# Patient Record
Sex: MALE | Race: WHITE | URBAN - METROPOLITAN AREA
[De-identification: names, ages, dates, MRNs, and addresses within clinical notes are randomized per-mention and may not be internally consistent; named-entity substitution may affect disease eponyms.]

---

## 2018-10-26 ENCOUNTER — EMERGENCY (EMERGENCY)
Facility: HOSPITAL | Age: 43
LOS: 0 days | Discharge: HOME | End: 2018-10-27
Attending: EMERGENCY MEDICINE | Admitting: EMERGENCY MEDICINE
Payer: COMMERCIAL

## 2018-10-26 VITALS — WEIGHT: 220.02 LBS | HEIGHT: 72 IN

## 2018-10-26 DIAGNOSIS — R10.9 UNSPECIFIED ABDOMINAL PAIN: ICD-10-CM

## 2018-10-26 DIAGNOSIS — E27.8 OTHER SPECIFIED DISORDERS OF ADRENAL GLAND: ICD-10-CM

## 2018-10-26 DIAGNOSIS — N20.0 CALCULUS OF KIDNEY: ICD-10-CM

## 2018-10-26 LAB
ALBUMIN SERPL ELPH-MCNC: 4.2 G/DL — SIGNIFICANT CHANGE UP (ref 3.3–5)
ALP SERPL-CCNC: 58 U/L — SIGNIFICANT CHANGE UP (ref 40–120)
ALT FLD-CCNC: 21 U/L — SIGNIFICANT CHANGE UP (ref 12–78)
ANION GAP SERPL CALC-SCNC: 5 MMOL/L — SIGNIFICANT CHANGE UP (ref 5–17)
APTT BLD: 32.8 SEC — SIGNIFICANT CHANGE UP (ref 27.5–37.4)
AST SERPL-CCNC: 26 U/L — SIGNIFICANT CHANGE UP (ref 15–37)
BASOPHILS # BLD AUTO: 0.03 K/UL — SIGNIFICANT CHANGE UP (ref 0–0.2)
BASOPHILS NFR BLD AUTO: 0.4 % — SIGNIFICANT CHANGE UP (ref 0–2)
BILIRUB SERPL-MCNC: 0.9 MG/DL — SIGNIFICANT CHANGE UP (ref 0.2–1.2)
BUN SERPL-MCNC: 12 MG/DL — SIGNIFICANT CHANGE UP (ref 7–23)
CALCIUM SERPL-MCNC: 9.1 MG/DL — SIGNIFICANT CHANGE UP (ref 8.5–10.1)
CHLORIDE SERPL-SCNC: 104 MMOL/L — SIGNIFICANT CHANGE UP (ref 96–108)
CO2 SERPL-SCNC: 30 MMOL/L — SIGNIFICANT CHANGE UP (ref 22–31)
CREAT SERPL-MCNC: 1.28 MG/DL — SIGNIFICANT CHANGE UP (ref 0.5–1.3)
EOSINOPHIL # BLD AUTO: 0.03 K/UL — SIGNIFICANT CHANGE UP (ref 0–0.5)
EOSINOPHIL NFR BLD AUTO: 0.4 % — SIGNIFICANT CHANGE UP (ref 0–6)
GLUCOSE SERPL-MCNC: 106 MG/DL — HIGH (ref 70–99)
HCT VFR BLD CALC: 43.4 % — SIGNIFICANT CHANGE UP (ref 39–50)
HGB BLD-MCNC: 14.9 G/DL — SIGNIFICANT CHANGE UP (ref 13–17)
IMM GRANULOCYTES NFR BLD AUTO: 0.4 % — SIGNIFICANT CHANGE UP (ref 0–1.5)
INR BLD: 1.12 RATIO — SIGNIFICANT CHANGE UP (ref 0.88–1.16)
LIDOCAIN IGE QN: 66 U/L — LOW (ref 73–393)
LYMPHOCYTES # BLD AUTO: 1.31 K/UL — SIGNIFICANT CHANGE UP (ref 1–3.3)
LYMPHOCYTES # BLD AUTO: 15.6 % — SIGNIFICANT CHANGE UP (ref 13–44)
MCHC RBC-ENTMCNC: 29.8 PG — SIGNIFICANT CHANGE UP (ref 27–34)
MCHC RBC-ENTMCNC: 34.3 GM/DL — SIGNIFICANT CHANGE UP (ref 32–36)
MCV RBC AUTO: 86.8 FL — SIGNIFICANT CHANGE UP (ref 80–100)
MONOCYTES # BLD AUTO: 0.67 K/UL — SIGNIFICANT CHANGE UP (ref 0–0.9)
MONOCYTES NFR BLD AUTO: 8 % — SIGNIFICANT CHANGE UP (ref 2–14)
NEUTROPHILS # BLD AUTO: 6.33 K/UL — SIGNIFICANT CHANGE UP (ref 1.8–7.4)
NEUTROPHILS NFR BLD AUTO: 75.2 % — SIGNIFICANT CHANGE UP (ref 43–77)
NRBC # BLD: 0 /100 WBCS — SIGNIFICANT CHANGE UP (ref 0–0)
PLATELET # BLD AUTO: 193 K/UL — SIGNIFICANT CHANGE UP (ref 150–400)
POTASSIUM SERPL-MCNC: 4.5 MMOL/L — SIGNIFICANT CHANGE UP (ref 3.5–5.3)
POTASSIUM SERPL-SCNC: 4.5 MMOL/L — SIGNIFICANT CHANGE UP (ref 3.5–5.3)
PROT SERPL-MCNC: 7.8 GM/DL — SIGNIFICANT CHANGE UP (ref 6–8.3)
PROTHROM AB SERPL-ACNC: 12.1 SEC — SIGNIFICANT CHANGE UP (ref 9.8–12.7)
RBC # BLD: 5 M/UL — SIGNIFICANT CHANGE UP (ref 4.2–5.8)
RBC # FLD: 12.6 % — SIGNIFICANT CHANGE UP (ref 10.3–14.5)
SODIUM SERPL-SCNC: 139 MMOL/L — SIGNIFICANT CHANGE UP (ref 135–145)
WBC # BLD: 8.4 K/UL — SIGNIFICANT CHANGE UP (ref 3.8–10.5)
WBC # FLD AUTO: 8.4 K/UL — SIGNIFICANT CHANGE UP (ref 3.8–10.5)

## 2018-10-26 PROCEDURE — 74177 CT ABD & PELVIS W/CONTRAST: CPT | Mod: 26

## 2018-10-26 PROCEDURE — 99284 EMERGENCY DEPT VISIT MOD MDM: CPT | Mod: 25

## 2018-10-26 RX ORDER — PANTOPRAZOLE SODIUM 20 MG/1
40 TABLET, DELAYED RELEASE ORAL ONCE
Qty: 0 | Refills: 0 | Status: DISCONTINUED | OUTPATIENT
Start: 2018-10-26 | End: 2018-10-26

## 2018-10-26 RX ORDER — SODIUM CHLORIDE 9 MG/ML
1000 INJECTION INTRAMUSCULAR; INTRAVENOUS; SUBCUTANEOUS ONCE
Qty: 0 | Refills: 0 | Status: COMPLETED | OUTPATIENT
Start: 2018-10-26 | End: 2018-10-26

## 2018-10-26 RX ADMIN — SODIUM CHLORIDE 1000 MILLILITER(S): 9 INJECTION INTRAMUSCULAR; INTRAVENOUS; SUBCUTANEOUS at 20:19

## 2018-10-26 NOTE — ED ADULT NURSE NOTE - OBJECTIVE STATEMENT
pt. c/o of abd. pain that started this AM. pt. states pain starts on right sides and radiates anteriorly to left side. pt. denies CP, SOB, n/v/d, dysuria, constipation, fever, chills. pt. states eating and drinking increase the pain. His co-worker experienced similar symptoms today.

## 2018-10-26 NOTE — ED STATDOCS - ATTENDING CONTRIBUTION TO CARE
I, Carlos Ocampo DO,  performed the initial face to face bedside interview with this patient regarding history of present illness, review of symptoms and relevant past medical, social and family history.  I completed an independent physical examination.  I was the initial provider who evaluated this patient. I have signed out the follow up of any pending tests (i.e. labs, radiological studies) to the ACP.  I have communicated the patient’s plan of care and disposition with the ACP.

## 2018-10-26 NOTE — ED STATDOCS - NS_ ATTENDINGSCRIBEDETAILS _ED_A_ED_FT
Carlos Ocampo DO (Attending): The history, relevant review of systems, past medical and surgical history, medical decision making, and physical examination was documented by the scribe in my presence and I attest to the accuracy of the documentation.

## 2018-10-26 NOTE — ED STATDOCS - PROGRESS NOTE DETAILS
42 y/o male with a PMHx of kidney stones presents to the ED c/o waxing and waning, intermittent right sided abd pain since today, relieved by belching, radiating to left side of abd. +decreased appetite. No BM yet today. Denies fever, chills, n/v/d, dysuria. +family history gallbladder issues. Denies tobacco use, EtOH use, illicit drug use. Declines HIV testing. Abdomen: soft, non tender, non distended, no cva tenderness. plan: ct, labs and reeval. pt refused pain meds. -Isamar Saini PA-C spoke to patient in depth about his ct results advised him to fu with pmd Bruce Macias for MRI in regards to his adrenal glands. pt also educated on increase fluids and fu with urologist for his kidney stone. pt agrees with plan and well appearing on dc. -Isamar Saini PA-C

## 2018-10-26 NOTE — ED STATDOCS - OBJECTIVE STATEMENT
44 y/o male with a PMHx of kidney stones presents to the ED c/o waxing and waning, intermittent right sided abd pain since today, relieved by belching, radiating to left side of abd. +decreased appetite. No BM yet today. Denies fever, chills, n/v/d, dysuria. +family history gallbladder issues. Denies tobacco use, EtOH use, illicit drug use. Declines HIV testing.

## 2018-10-27 VITALS
SYSTOLIC BLOOD PRESSURE: 141 MMHG | OXYGEN SATURATION: 98 % | HEART RATE: 63 BPM | DIASTOLIC BLOOD PRESSURE: 96 MMHG | RESPIRATION RATE: 19 BRPM | TEMPERATURE: 99 F

## 2018-10-27 RX ORDER — TAMSULOSIN HYDROCHLORIDE 0.4 MG/1
0.4 CAPSULE ORAL AT BEDTIME
Qty: 0 | Refills: 0 | Status: DISCONTINUED | OUTPATIENT
Start: 2018-10-27 | End: 2018-10-27

## 2018-10-27 RX ORDER — IBUPROFEN 200 MG
1 TABLET ORAL
Qty: 21 | Refills: 0
Start: 2018-10-27 | End: 2018-11-02

## 2018-10-27 RX ORDER — IBUPROFEN 200 MG
600 TABLET ORAL ONCE
Qty: 0 | Refills: 0 | Status: COMPLETED | OUTPATIENT
Start: 2018-10-27 | End: 2018-10-27

## 2018-10-27 RX ORDER — TAMSULOSIN HYDROCHLORIDE 0.4 MG/1
1 CAPSULE ORAL
Qty: 7 | Refills: 0
Start: 2018-10-27 | End: 2018-11-02

## 2018-10-27 RX ADMIN — TAMSULOSIN HYDROCHLORIDE 0.4 MILLIGRAM(S): 0.4 CAPSULE ORAL at 00:21

## 2020-01-25 ENCOUNTER — INPATIENT (INPATIENT)
Facility: HOSPITAL | Age: 45
LOS: 1 days | Discharge: HOME | DRG: 392 | End: 2020-01-27
Attending: COLON & RECTAL SURGERY | Admitting: COLON & RECTAL SURGERY
Payer: COMMERCIAL

## 2020-01-25 VITALS — HEIGHT: 72 IN | WEIGHT: 220.02 LBS

## 2020-01-25 DIAGNOSIS — K57.20 DIVERTICULITIS OF LARGE INTESTINE WITH PERFORATION AND ABSCESS WITHOUT BLEEDING: ICD-10-CM

## 2020-01-25 PROBLEM — N20.0 CALCULUS OF KIDNEY: Chronic | Status: ACTIVE | Noted: 2018-10-27

## 2020-01-25 LAB
ALBUMIN SERPL ELPH-MCNC: 3.3 G/DL — SIGNIFICANT CHANGE UP (ref 3.3–5)
ALP SERPL-CCNC: 75 U/L — SIGNIFICANT CHANGE UP (ref 40–120)
ALT FLD-CCNC: 18 U/L — SIGNIFICANT CHANGE UP (ref 12–78)
ANION GAP SERPL CALC-SCNC: 3 MMOL/L — LOW (ref 5–17)
APPEARANCE UR: CLEAR — SIGNIFICANT CHANGE UP
APTT BLD: 33.4 SEC — SIGNIFICANT CHANGE UP (ref 27.5–36.3)
AST SERPL-CCNC: 17 U/L — SIGNIFICANT CHANGE UP (ref 15–37)
BASOPHILS # BLD AUTO: 0.03 K/UL — SIGNIFICANT CHANGE UP (ref 0–0.2)
BASOPHILS NFR BLD AUTO: 0.3 % — SIGNIFICANT CHANGE UP (ref 0–2)
BILIRUB SERPL-MCNC: 0.4 MG/DL — SIGNIFICANT CHANGE UP (ref 0.2–1.2)
BILIRUB UR-MCNC: NEGATIVE — SIGNIFICANT CHANGE UP
BUN SERPL-MCNC: 14 MG/DL — SIGNIFICANT CHANGE UP (ref 7–23)
CALCIUM SERPL-MCNC: 9 MG/DL — SIGNIFICANT CHANGE UP (ref 8.5–10.1)
CHLORIDE SERPL-SCNC: 106 MMOL/L — SIGNIFICANT CHANGE UP (ref 96–108)
CO2 SERPL-SCNC: 31 MMOL/L — SIGNIFICANT CHANGE UP (ref 22–31)
COLOR SPEC: YELLOW — SIGNIFICANT CHANGE UP
CREAT SERPL-MCNC: 1.02 MG/DL — SIGNIFICANT CHANGE UP (ref 0.5–1.3)
DIFF PNL FLD: NEGATIVE — SIGNIFICANT CHANGE UP
EOSINOPHIL # BLD AUTO: 0.11 K/UL — SIGNIFICANT CHANGE UP (ref 0–0.5)
EOSINOPHIL NFR BLD AUTO: 1.1 % — SIGNIFICANT CHANGE UP (ref 0–6)
GLUCOSE SERPL-MCNC: 85 MG/DL — SIGNIFICANT CHANGE UP (ref 70–99)
GLUCOSE UR QL: NEGATIVE MG/DL — SIGNIFICANT CHANGE UP
HCT VFR BLD CALC: 41.1 % — SIGNIFICANT CHANGE UP (ref 39–50)
HGB BLD-MCNC: 13.9 G/DL — SIGNIFICANT CHANGE UP (ref 13–17)
IMM GRANULOCYTES NFR BLD AUTO: 0.6 % — SIGNIFICANT CHANGE UP (ref 0–1.5)
INR BLD: 1.24 RATIO — HIGH (ref 0.88–1.16)
KETONES UR-MCNC: NEGATIVE — SIGNIFICANT CHANGE UP
LEUKOCYTE ESTERASE UR-ACNC: NEGATIVE — SIGNIFICANT CHANGE UP
LIDOCAIN IGE QN: 87 U/L — SIGNIFICANT CHANGE UP (ref 73–393)
LYMPHOCYTES # BLD AUTO: 1.33 K/UL — SIGNIFICANT CHANGE UP (ref 1–3.3)
LYMPHOCYTES # BLD AUTO: 13 % — SIGNIFICANT CHANGE UP (ref 13–44)
MCHC RBC-ENTMCNC: 29.1 PG — SIGNIFICANT CHANGE UP (ref 27–34)
MCHC RBC-ENTMCNC: 33.8 GM/DL — SIGNIFICANT CHANGE UP (ref 32–36)
MCV RBC AUTO: 86 FL — SIGNIFICANT CHANGE UP (ref 80–100)
MONOCYTES # BLD AUTO: 0.82 K/UL — SIGNIFICANT CHANGE UP (ref 0–0.9)
MONOCYTES NFR BLD AUTO: 8 % — SIGNIFICANT CHANGE UP (ref 2–14)
NEUTROPHILS # BLD AUTO: 7.92 K/UL — HIGH (ref 1.8–7.4)
NEUTROPHILS NFR BLD AUTO: 77 % — SIGNIFICANT CHANGE UP (ref 43–77)
NITRITE UR-MCNC: NEGATIVE — SIGNIFICANT CHANGE UP
PH UR: 6.5 — SIGNIFICANT CHANGE UP (ref 5–8)
PLATELET # BLD AUTO: 309 K/UL — SIGNIFICANT CHANGE UP (ref 150–400)
POTASSIUM SERPL-MCNC: 3.8 MMOL/L — SIGNIFICANT CHANGE UP (ref 3.5–5.3)
POTASSIUM SERPL-SCNC: 3.8 MMOL/L — SIGNIFICANT CHANGE UP (ref 3.5–5.3)
PROT SERPL-MCNC: 7.9 GM/DL — SIGNIFICANT CHANGE UP (ref 6–8.3)
PROT UR-MCNC: NEGATIVE MG/DL — SIGNIFICANT CHANGE UP
PROTHROM AB SERPL-ACNC: 13.8 SEC — HIGH (ref 10–12.9)
RBC # BLD: 4.78 M/UL — SIGNIFICANT CHANGE UP (ref 4.2–5.8)
RBC # FLD: 12.1 % — SIGNIFICANT CHANGE UP (ref 10.3–14.5)
SODIUM SERPL-SCNC: 140 MMOL/L — SIGNIFICANT CHANGE UP (ref 135–145)
SP GR SPEC: 1.01 — SIGNIFICANT CHANGE UP (ref 1.01–1.02)
UROBILINOGEN FLD QL: NEGATIVE MG/DL — SIGNIFICANT CHANGE UP
WBC # BLD: 10.27 K/UL — SIGNIFICANT CHANGE UP (ref 3.8–10.5)
WBC # FLD AUTO: 10.27 K/UL — SIGNIFICANT CHANGE UP (ref 3.8–10.5)

## 2020-01-25 PROCEDURE — 36415 COLL VENOUS BLD VENIPUNCTURE: CPT

## 2020-01-25 PROCEDURE — 99221 1ST HOSP IP/OBS SF/LOW 40: CPT

## 2020-01-25 PROCEDURE — 80048 BASIC METABOLIC PNL TOTAL CA: CPT

## 2020-01-25 PROCEDURE — 83735 ASSAY OF MAGNESIUM: CPT

## 2020-01-25 PROCEDURE — 74177 CT ABD & PELVIS W/CONTRAST: CPT | Mod: 26

## 2020-01-25 PROCEDURE — 85027 COMPLETE CBC AUTOMATED: CPT

## 2020-01-25 PROCEDURE — 84100 ASSAY OF PHOSPHORUS: CPT

## 2020-01-25 PROCEDURE — 87040 BLOOD CULTURE FOR BACTERIA: CPT

## 2020-01-25 RX ORDER — METRONIDAZOLE 500 MG
500 TABLET ORAL ONCE
Refills: 0 | Status: COMPLETED | OUTPATIENT
Start: 2020-01-25 | End: 2020-01-25

## 2020-01-25 RX ORDER — PIPERACILLIN AND TAZOBACTAM 4; .5 G/20ML; G/20ML
3.38 INJECTION, POWDER, LYOPHILIZED, FOR SOLUTION INTRAVENOUS EVERY 8 HOURS
Refills: 0 | Status: DISCONTINUED | OUTPATIENT
Start: 2020-01-25 | End: 2020-01-27

## 2020-01-25 RX ORDER — ENOXAPARIN SODIUM 100 MG/ML
40 INJECTION SUBCUTANEOUS DAILY
Refills: 0 | Status: DISCONTINUED | OUTPATIENT
Start: 2020-01-25 | End: 2020-01-27

## 2020-01-25 RX ORDER — SODIUM CHLORIDE 9 MG/ML
1000 INJECTION INTRAMUSCULAR; INTRAVENOUS; SUBCUTANEOUS
Refills: 0 | Status: DISCONTINUED | OUTPATIENT
Start: 2020-01-25 | End: 2020-01-26

## 2020-01-25 RX ORDER — ACETAMINOPHEN 500 MG
650 TABLET ORAL ONCE
Refills: 0 | Status: DISCONTINUED | OUTPATIENT
Start: 2020-01-25 | End: 2020-01-27

## 2020-01-25 RX ORDER — KETOROLAC TROMETHAMINE 30 MG/ML
15 SYRINGE (ML) INJECTION EVERY 4 HOURS
Refills: 0 | Status: DISCONTINUED | OUTPATIENT
Start: 2020-01-25 | End: 2020-01-27

## 2020-01-25 RX ORDER — KETOROLAC TROMETHAMINE 30 MG/ML
30 SYRINGE (ML) INJECTION ONCE
Refills: 0 | Status: DISCONTINUED | OUTPATIENT
Start: 2020-01-25 | End: 2020-01-25

## 2020-01-25 RX ORDER — CIPROFLOXACIN LACTATE 400MG/40ML
400 VIAL (ML) INTRAVENOUS ONCE
Refills: 0 | Status: COMPLETED | OUTPATIENT
Start: 2020-01-25 | End: 2020-01-25

## 2020-01-25 RX ORDER — TAMSULOSIN HYDROCHLORIDE 0.4 MG/1
0.4 CAPSULE ORAL AT BEDTIME
Refills: 0 | Status: DISCONTINUED | OUTPATIENT
Start: 2020-01-25 | End: 2020-01-27

## 2020-01-25 RX ORDER — SODIUM CHLORIDE 9 MG/ML
1000 INJECTION INTRAMUSCULAR; INTRAVENOUS; SUBCUTANEOUS ONCE
Refills: 0 | Status: COMPLETED | OUTPATIENT
Start: 2020-01-25 | End: 2020-01-25

## 2020-01-25 RX ADMIN — Medication 30 MILLIGRAM(S): at 17:44

## 2020-01-25 RX ADMIN — Medication 100 MILLIGRAM(S): at 19:55

## 2020-01-25 RX ADMIN — PIPERACILLIN AND TAZOBACTAM 25 GRAM(S): 4; .5 INJECTION, POWDER, LYOPHILIZED, FOR SOLUTION INTRAVENOUS at 22:28

## 2020-01-25 RX ADMIN — SODIUM CHLORIDE 1000 MILLILITER(S): 9 INJECTION INTRAMUSCULAR; INTRAVENOUS; SUBCUTANEOUS at 17:44

## 2020-01-25 RX ADMIN — Medication 200 MILLIGRAM(S): at 21:11

## 2020-01-25 NOTE — ED ADULT TRIAGE NOTE - CHIEF COMPLAINT QUOTE
pt presents to ED with complaints of lower abdominal pain x 4 days. pt reports hx of kidney stones and reports pain is similar to past stones. denies blood in urine, but endorses pain on urination.

## 2020-01-25 NOTE — H&P ADULT - HISTORY OF PRESENT ILLNESS
Pt is a 44 M with lower abdominal pain for 2 days. He has not had pain like this before, but states it was similar to his kidney stones. He has never had diverticulitis in the past, he denies prior colonoscopy. He reports mild nausea, is having bowel movements and flatus but is more constipated than normal. He denies fevers.

## 2020-01-25 NOTE — H&P ADULT - ATTENDING COMMENTS
Seen and examined.  First episode of diverticulitis, no prior colonoscopy.  CT with contained perforation, no free air or fluid.  Labwork and imaging reviewed  Admit, NPO, IVF, abx.  Follow abdominal exam.  Advance diet as clinically improves.  Re-scan if clinically worsens per vital signs, labwork and clinical exam.

## 2020-01-25 NOTE — ED STATDOCS - NS_ ATTENDINGSCRIBEDETAILS _ED_A_ED_FT
I, Jacques Lion MD,  performed the initial face to face bedside interview with this patient regarding history of present illness, review of symptoms and relevant past medical, social and family history.  I completed an independent physical examination.  I was the initial provider who evaluated this patient.  The history, relevant review of systems, past medical and surgical history, medical decision making, and physical examination was documented by the scribe in my presence and I attest to the accuracy of the documentation.

## 2020-01-25 NOTE — ED STATDOCS - CLINICAL SUMMARY MEDICAL DECISION MAKING FREE TEXT BOX
45 y/o M presents to ED with lower abdominal pain worse over past day, exam with TTP with RLQ, LLQ, with guarding no rebound, concern for appendicitis vs diverticulitis vs. UTI, will obtain labs, CT, pain control, reassess.

## 2020-01-25 NOTE — ED STATDOCS - PHYSICAL EXAMINATION
Constitutional: NAD AAOx3  Eyes: PERRLA EOMI  Head: Normocephalic atraumatic  Mouth: MMM  Cardiac: +tachycardic rate   Resp: Lungs CTAB  GI: +ttp abdomen RLQ, LLQ   Neuro: CN2-12 intact  Skin: No rashes Constitutional: mild distressAAOx3  Eyes: PERRLA EOMI  Head: Normocephalic atraumatic  Mouth: MMM  Cardiac: +tachycardic rate   Resp: Lungs CTAB  GI: +ttp abdomen RLQ, LLQ guarding no rebound no cvat  Neuro: CN2-12 intact  Skin: No rashes

## 2020-01-25 NOTE — H&P ADULT - NSHPPHYSICALEXAM_GEN_ALL_CORE
Vital Signs Last 24 Hrs  T(C): 37.6 (25 Jan 2020 16:39), Max: 37.6 (25 Jan 2020 16:39)  T(F): 99.6 (25 Jan 2020 16:39), Max: 99.6 (25 Jan 2020 16:39)  HR: 112 (25 Jan 2020 16:39) (112 - 112)  BP: 140/82 (25 Jan 2020 16:39) (140/82 - 140/82)  BP(mean): --  RR: 14 (25 Jan 2020 16:39) (14 - 14)  SpO2: 97% (25 Jan 2020 16:39) (97% - 97%)      GEN NAD AOX3  CV RRR  Pulm CTA b/l  Abd Soft nontender nondistended no rebound or guarding

## 2020-01-25 NOTE — ED STATDOCS - NS ED ROS FT
Constitutional: No fever +chills.  Eyes: No visual changes  HEENT: No throat pain  CV: No chest pain  Resp: No SOB no cough  GI: No nausea or vomiting. +abdominal pain.  : +mild dysuria.   MSK: No musculoskeletal pain  Skin: No rash  Neuro: +headache.

## 2020-01-25 NOTE — H&P ADULT - NSHPLABSRESULTS_GEN_ALL_CORE
Complete Blood Count + Automated Diff (01.25.20 @ 17:20)    WBC Count: 10.27 K/uL    RBC Count: 4.78 M/uL    Hemoglobin: 13.9 g/dL    Hematocrit: 41.1 %    Mean Cell Volume: 86.0 fl    Mean Cell Hemoglobin: 29.1 pg    Mean Cell Hemoglobin Conc: 33.8 gm/dL    Red Cell Distrib Width: 12.1 %    Platelet Count - Automated: 309 K/uL    Comprehensive Metabolic Panel (01.25.20 @ 17:20)    Sodium, Serum: 140 mmol/L    Potassium, Serum: 3.8 mmol/L    Chloride, Serum: 106 mmol/L    Carbon Dioxide, Serum: 31 mmol/L    Anion Gap, Serum: 3 mmol/L    Blood Urea Nitrogen, Serum: 14 mg/dL    Creatinine, Serum: 1.02 mg/dL    Glucose, Serum: 85 mg/dL    Calcium, Total Serum: 9.0 mg/dL    Protein Total, Serum: 7.9 gm/dL    Albumin, Serum: 3.3 g/dL    Bilirubin Total, Serum: 0.4 mg/dL    Alkaline Phosphatase, Serum: 75 U/L    Aspartate Aminotransferase (AST/SGOT): 17 U/L    Alanine Aminotransferase (ALT/SGPT): 18 U/L    eGFR if Non : 88: Interpretative comment      < from: CT Abdomen and Pelvis w/ IV Cont (01.25.20 @ 18:15) >      EXAM:  CT ABDOMEN AND PELVIS IC                            PROCEDURE DATE:  01/25/2020          INTERPRETATION:  CLINICAL INFORMATION: Right lower quadrant abdominal pain. Rule out appendicitis.    COMPARISON: CT abdomen/pelvis of 10/26/2018.    PROCEDURE:   CT of the Abdomen and Pelvis was performed with intravenous contrast.   Intravenous contrast: 90 ml Omnipaque 350. 10 ml discarded.  Oral contrast: None.  Sagittal and coronal reformats were performed.    FINDINGS:    LOWER CHEST: Stable 2 mm left lower lobe pulmonary nodule (2:20)    LIVER: Within normal limits.  BILE DUCTS: Normal caliber.  GALLBLADDER: Within normal limits.  SPLEEN: Within normal limits.  PANCREAS: Within normal limits.  ADRENALS: 4.7 x 3.7 cm right adrenal gland hypodense mass with associated calcification, relatively stable since prior examination, given differences in technique. Unremarkable left adrenal gland.  KIDNEYS/URETERS: Low-attenuation lesion within the bilateral lower poles too small to accurately characterize. No hydronephrosis. Symmetric parenchymal enhancement.    BLADDER: Within normal limits.  REPRODUCTIVE ORGANS: Prostate within normal limits.    BOWEL: Short segment colonic bowel wall thickening and pericolonic inflammatory change involving the mid to distal sigmoid colon. Localized foci of extraluminal gas consistent with perforation. No bowel obstruction. Appendix normal.  PERITONEUM: No ascites. No remote pneumoperitoneum.  VESSELS: Within normal limits.  RETROPERITONEUM/LYMPH NODES: No lymphadenopathy.    ABDOMINAL WALL: Within normal limits.  BONES: Degenerative disc disease at L4-L5.    IMPRESSION:     Acute mid to distal sigmoid diverticulitis with foci of localized extraluminal gas consistent with perforation. No discrete drainable extraluminal collection.    Given short segment involvement, follow-up with colonoscopy, when technically and clinically feasible would be recommended to exclude underlying lesion, if no contraindications exist.    Relatively stable right adrenal hypodense mass, which can be further evaluated with nonemergent contrast-enhanced MRI with adrenal protocol.                ILIA MCCOY M.D., ATTENDING RADIOLOGIST  This document has been electronically signed. Jan 25 2020  6:35PM                < end of copied text >

## 2020-01-25 NOTE — ED ADULT NURSE NOTE - OBJECTIVE STATEMENT
Patient comes in with b/l flank pain, painful urination and mid - lower abd pain x 3 days. Patient denies chest pain, sob. Patient has hx of kidney stones.

## 2020-01-25 NOTE — ED STATDOCS - PROGRESS NOTE DETAILS
45 y/o M with PMH of kidney stones presents with lower abdominal pain x 4 days. States pain feels similar to prior episodes of kidney stones. + chills, headache, dysuria. Denies fever, vomiting, frequency, urgency. Has not tried medications for pain at home. PE: Well appearing. Cardiac: s1s2, tachycardic. Lungs: CTAB. Abdomen: NBS x4, soft, +RLQ and LLQ tenderness. no CVAT. 43 y/o M with PMH of kidney stones presents with lower abdominal pain x 4 days. States pain feels similar to prior episodes of kidney stones. + chills, headache, dysuria. Denies fever, vomiting, frequency, urgency. Has not tried medications for pain at home. PE: Well appearing. Cardiac: s1s2, tachycardic. Lungs: CTAB. Abdomen: NBS x4, soft, +RLQ and LLQ tenderness. no CVAT. A/P: r/o appendicitis, diverticulitis, less concern for kidney stone based on physical exam. plan for labs, UA, CTAP, analgesia, IVF, reassess. - Binu Wise PA-C CT reports perforated sigmoid diverticulitis. Cipro & flagyl initiated. D/w Dr. Nance, recommended to discuss with colorectal surgery. - Binu Wise PA-C D/w Dr. Neri, recommended admission. - Binu Wise PA-C

## 2020-01-25 NOTE — H&P ADULT - ASSESSMENT
44 year old male with first episode of sigmoid diverticulitis with microperforation    - Admit to Dr. Neri  - Pain control PRN  - IV abx Zosyn  - NPO/IVF  - DVT ppx  - Serial abdominal exams  - Ambulate  - If failure of medical management, patient understands role of surgical intervention    Discussed with Dr. Neri

## 2020-01-25 NOTE — ED STATDOCS - OBJECTIVE STATEMENT
45 y/o male with a PMHx of kidney stones presents to ED c/o lower abdominal pain x4 days. Pt reports pain is similar to past kidney stones, last kidney stone in October, 2019. Abdominal pain radiates to back. +mild dysuria. Denies frequency, urgency. +headache. +chills. Denies hematuria. NKDA. Nonsmoker. No pain medications taken.

## 2020-01-26 LAB
CULTURE RESULTS: NO GROWTH — SIGNIFICANT CHANGE UP
SPECIMEN SOURCE: SIGNIFICANT CHANGE UP

## 2020-01-26 PROCEDURE — 99232 SBSQ HOSP IP/OBS MODERATE 35: CPT

## 2020-01-26 RX ORDER — SODIUM CHLORIDE 9 MG/ML
1000 INJECTION INTRAMUSCULAR; INTRAVENOUS; SUBCUTANEOUS
Refills: 0 | Status: DISCONTINUED | OUTPATIENT
Start: 2020-01-26 | End: 2020-01-27

## 2020-01-26 RX ADMIN — PIPERACILLIN AND TAZOBACTAM 25 GRAM(S): 4; .5 INJECTION, POWDER, LYOPHILIZED, FOR SOLUTION INTRAVENOUS at 13:11

## 2020-01-26 RX ADMIN — Medication 15 MILLIGRAM(S): at 11:43

## 2020-01-26 RX ADMIN — SODIUM CHLORIDE 80 MILLILITER(S): 9 INJECTION INTRAMUSCULAR; INTRAVENOUS; SUBCUTANEOUS at 18:21

## 2020-01-26 RX ADMIN — ENOXAPARIN SODIUM 40 MILLIGRAM(S): 100 INJECTION SUBCUTANEOUS at 11:30

## 2020-01-26 RX ADMIN — PIPERACILLIN AND TAZOBACTAM 25 GRAM(S): 4; .5 INJECTION, POWDER, LYOPHILIZED, FOR SOLUTION INTRAVENOUS at 06:28

## 2020-01-26 RX ADMIN — PIPERACILLIN AND TAZOBACTAM 25 GRAM(S): 4; .5 INJECTION, POWDER, LYOPHILIZED, FOR SOLUTION INTRAVENOUS at 21:16

## 2020-01-26 RX ADMIN — SODIUM CHLORIDE 80 MILLILITER(S): 9 INJECTION INTRAMUSCULAR; INTRAVENOUS; SUBCUTANEOUS at 11:21

## 2020-01-26 RX ADMIN — Medication 15 MILLIGRAM(S): at 11:28

## 2020-01-26 NOTE — PROGRESS NOTE ADULT - ASSESSMENT
44 year old male with first episode of sigmoid diverticulitis with microperforation    - Pain control PRN  - IV abx Zosyn  - Advance Diet as tolerated today  - DVT ppx  - Serial abdominal exams  - Ambulate      Discussed with Dr. Neri

## 2020-01-26 NOTE — PROGRESS NOTE ADULT - SUBJECTIVE AND OBJECTIVE BOX
Pain resolving, afebrile, remains NPO. Ambulating well and feeling better today      Vital Signs Last 24 Hrs  T(C): 37.2 (26 Jan 2020 05:11), Max: 37.6 (25 Jan 2020 16:39)  T(F): 99 (26 Jan 2020 05:11), Max: 99.6 (25 Jan 2020 16:39)  HR: 87 (26 Jan 2020 05:11) (78 - 112)  BP: 106/55 (26 Jan 2020 05:11) (106/55 - 140/82)  BP(mean): 85 (25 Jan 2020 20:10) (85 - 85)  RR: 17 (26 Jan 2020 05:11) (14 - 17)  SpO2: 98% (26 Jan 2020 05:11) (96% - 99%)      GEN NAD AOx3  CV RRR  Puml CAT b/l  Abd Soft nontender nondistened no rebound or guarding    Complete Blood Count + Automated Diff (01.25.20 @ 17:20)    WBC Count: 10.27 K/uL    RBC Count: 4.78 M/uL    Hemoglobin: 13.9 g/dL    Hematocrit: 41.1 %    Mean Cell Volume: 86.0 fl    Mean Cell Hemoglobin: 29.1 pg    Mean Cell Hemoglobin Conc: 33.8 gm/dL    Red Cell Distrib Width: 12.1 %    Platelet Count - Automated: 309 K/uL    Auto Neutrophil #: 7.92 K/uL    Auto Lymphocyte #: 1.33 K/uL    Auto Monocyte #: 0.82 K/uL    Auto Eosinophil #: 0.11 K/uL    Auto Basophil #: 0.03 K/uL    Auto Neutrophil %: 77.0: Differential percentages must be correlated with absolute numbers for  clinical significance. %    Auto Lymphocyte %: 13.0 %    Auto Monocyte %: 8.0 %    Auto Eosinophil %: 1.1 %    Auto Basophil %: 0.3 %    Auto Immature Granulocyte %: 0.6 %

## 2020-01-26 NOTE — PROGRESS NOTE ADULT - ATTENDING COMMENTS
Seen and examined.  Pain improved. Denies N/V. No flatus or stool.  AFVSS  NAD  soft, NT, mild distention  A/P - Contained sigmoid perforation    Advance to clear liquid diet. Decrease IVF. Repeat labwork in AM.  If continues to improve, anticipate continued advancement of diet with d/c tomorrow.  If worsens with clear liquids, may need to re-scan.  Discussed need for outpt colonoscopy in 6-8 weeks to r/o underlying malignancy.   Discussed finding of adrenal mass on CT, pt is aware and states it has been evaluated in past and found to be benign.

## 2020-01-27 ENCOUNTER — TRANSCRIPTION ENCOUNTER (OUTPATIENT)
Age: 45
End: 2020-01-27

## 2020-01-27 VITALS
OXYGEN SATURATION: 96 % | RESPIRATION RATE: 18 BRPM | TEMPERATURE: 97 F | SYSTOLIC BLOOD PRESSURE: 110 MMHG | DIASTOLIC BLOOD PRESSURE: 64 MMHG | HEART RATE: 76 BPM

## 2020-01-27 LAB
ANION GAP SERPL CALC-SCNC: 5 MMOL/L — SIGNIFICANT CHANGE UP (ref 5–17)
BUN SERPL-MCNC: 11 MG/DL — SIGNIFICANT CHANGE UP (ref 7–23)
CALCIUM SERPL-MCNC: 9.1 MG/DL — SIGNIFICANT CHANGE UP (ref 8.5–10.1)
CHLORIDE SERPL-SCNC: 109 MMOL/L — HIGH (ref 96–108)
CO2 SERPL-SCNC: 26 MMOL/L — SIGNIFICANT CHANGE UP (ref 22–31)
CREAT SERPL-MCNC: 0.85 MG/DL — SIGNIFICANT CHANGE UP (ref 0.5–1.3)
GLUCOSE SERPL-MCNC: 86 MG/DL — SIGNIFICANT CHANGE UP (ref 70–99)
HCT VFR BLD CALC: 40.9 % — SIGNIFICANT CHANGE UP (ref 39–50)
HGB BLD-MCNC: 13.7 G/DL — SIGNIFICANT CHANGE UP (ref 13–17)
MAGNESIUM SERPL-MCNC: 2.2 MG/DL — SIGNIFICANT CHANGE UP (ref 1.6–2.6)
MCHC RBC-ENTMCNC: 28.5 PG — SIGNIFICANT CHANGE UP (ref 27–34)
MCHC RBC-ENTMCNC: 33.5 GM/DL — SIGNIFICANT CHANGE UP (ref 32–36)
MCV RBC AUTO: 85 FL — SIGNIFICANT CHANGE UP (ref 80–100)
PHOSPHATE SERPL-MCNC: 2.5 MG/DL — SIGNIFICANT CHANGE UP (ref 2.5–4.5)
PLATELET # BLD AUTO: 314 K/UL — SIGNIFICANT CHANGE UP (ref 150–400)
POTASSIUM SERPL-MCNC: 3.8 MMOL/L — SIGNIFICANT CHANGE UP (ref 3.5–5.3)
POTASSIUM SERPL-SCNC: 3.8 MMOL/L — SIGNIFICANT CHANGE UP (ref 3.5–5.3)
RBC # BLD: 4.81 M/UL — SIGNIFICANT CHANGE UP (ref 4.2–5.8)
RBC # FLD: 11.9 % — SIGNIFICANT CHANGE UP (ref 10.3–14.5)
SODIUM SERPL-SCNC: 140 MMOL/L — SIGNIFICANT CHANGE UP (ref 135–145)
WBC # BLD: 4.47 K/UL — SIGNIFICANT CHANGE UP (ref 3.8–10.5)
WBC # FLD AUTO: 4.47 K/UL — SIGNIFICANT CHANGE UP (ref 3.8–10.5)

## 2020-01-27 RX ORDER — METRONIDAZOLE 500 MG
1 TABLET ORAL
Qty: 0 | Refills: 0 | DISCHARGE
Start: 2020-01-27 | End: 2020-02-05

## 2020-01-27 RX ORDER — MOXIFLOXACIN HYDROCHLORIDE TABLETS, 400 MG 400 MG/1
1 TABLET, FILM COATED ORAL
Qty: 20 | Refills: 0
Start: 2020-01-27

## 2020-01-27 RX ORDER — NITAZOXANIDE 500 MG/1
1 TABLET ORAL
Qty: 30 | Refills: 0
Start: 2020-01-27

## 2020-01-27 RX ORDER — CIPROFLOXACIN LACTATE 400MG/40ML
1 VIAL (ML) INTRAVENOUS
Qty: 0 | Refills: 0 | DISCHARGE
Start: 2020-01-27 | End: 2020-02-05

## 2020-01-27 RX ORDER — METRONIDAZOLE 500 MG
1 TABLET ORAL
Qty: 30 | Refills: 0
Start: 2020-01-27

## 2020-01-27 RX ADMIN — PIPERACILLIN AND TAZOBACTAM 25 GRAM(S): 4; .5 INJECTION, POWDER, LYOPHILIZED, FOR SOLUTION INTRAVENOUS at 05:48

## 2020-01-27 NOTE — PROGRESS NOTE ADULT - SUBJECTIVE AND OBJECTIVE BOX
Comprehensive Metabolic Panel (01.25.20 @ 17:20)    Sodium, Serum: 140 mmol/L    Potassium, Serum: 3.8 mmol/L    Chloride, Serum: 106 mmol/L    Carbon Dioxide, Serum: 31 mmol/L    Anion Gap, Serum: 3 mmol/L    Blood Urea Nitrogen, Serum: 14 mg/dL    Creatinine, Serum: 1.02 mg/dL    Glucose, Serum: 85 mg/dL    Calcium, Total Serum: 9.0 mg/dL    Protein Total, Serum: 7.9 gm/dL    Albumin, Serum: 3.3 g/dL    Bilirubin Total, Serum: 0.4 mg/dL    Alkaline Phosphatase, Serum: 75 U/L    Aspartate Aminotransferase (AST/SGOT): 17 U/L    Alanine Aminotransferase (ALT/SGPT): 18 U/L    eGFR if Non : 88: Interpretative comment  The units for eGFR are mL/min/1.73M2 (normalized body surface area). The  eGFR is calculated from a serum creatinine using the CKD-EPI equation.  Other variables required for calculation are race, age and sex. Among  patients with chronic kidney disease (CKD), the eGFR is useful in  determining the stage of disease according to KDOQI CKD classification.  All eGFR results are reported numerically with the following  interpretation.          GFR                    With                 Without     (ml/min/1.73 m2)    Kidney Damage       Kidney Damage        >= 90                    Stage 1                     Normal        60-89                    Stage 2                     Decreased GFR        30-59     Stage 3                     Stage 3        15-29                    Stage 4                     Stage 4        < 15                      Stage 5                     Stage 5  Each stage of CKD assumes that the associated GFR level has been in  effect for at least 3 months. Determination of stages one and two (with  eGFR > 59 ml/min/m2) requires estimation of kidney damage for at least 3  months as defined by structural or functional abnormalities.  Limitations: All estimates of GFR will be less accurate for patients at  extremes of muscle mass (including but not limited to frail elderly,  critically ill, or cancer patients), those with unusual diets, and those  with conditions associated with reduced secretion or extrarenal  elimination of creatinine. The eGFR equation is not recommended for use  in patients with unstable creatinine levels. mL/min/1.73M2    eGFR if African American: 102 mL/min/1.73M2    Pain resolving, afebrile, tolerates CLD. Ambulating well and feeling better today      Vital Signs Last 24 Hrs  T(C): 36.4 (27 Jan 2020 05:01), Max: 36.7 (26 Jan 2020 11:06)  T(F): 97.5 (27 Jan 2020 05:01), Max: 98 (26 Jan 2020 11:06)  HR: 68 (27 Jan 2020 05:01) (65 - 85)  BP: 109/59 (27 Jan 2020 05:01) (107/64 - 121/85)  BP(mean): --  RR: 17 (27 Jan 2020 05:01) (17 - 18)  SpO2: 97% (27 Jan 2020 05:01) (97% - 99%)      GEN NAD AOx3  CV RRR  Puml CAT b/l  Abd Soft nontender nondistened no rebound or guarding

## 2020-01-27 NOTE — PROGRESS NOTE ADULT - ASSESSMENT
44 year old male with first episode of sigmoid diverticulitis with microperforation    - Pain control PRN  -continue  IV abx Zosyn  - Advance to regular  Diet   - DVT ppx  - Serial abdominal exams  - Ambulate  - possible home d/c today       Will Discuss with Dr. Pino

## 2020-01-27 NOTE — DISCHARGE NOTE PROVIDER - CARE PROVIDER_API CALL
Vandana Neri)  ColonRectal Surgery; Surgery  [address] [place] [address]  Phone: [phone]  Fax: [phone]  Established Patient  Follow Up Time: 2 weeks

## 2020-01-27 NOTE — DISCHARGE NOTE NURSING/CASE MANAGEMENT/SOCIAL WORK - PATIENT PORTAL LINK FT
You can access the FollowMyHealth Patient Portal offered by [facility] by registering at the following website: [email or web address]. By joining [employer]’s FollowMyHealth portal, you will also be able to view your health information using other applications (apps) compatible with our system.

## 2020-01-27 NOTE — DISCHARGE NOTE PROVIDER - NSDCMRMEDTOKEN_GEN_ALL_CORE_FT
Flomax 0.4 mg oral capsule: 1 cap(s) orally once a day (at bedtime)    mg oral tablet: 1 tab(s) orally 3 times a day

## 2020-01-27 NOTE — DISCHARGE NOTE PROVIDER - HOSPITAL COURSE
This 43 Y/O Man presented to ED with Abdominal pain. He was diagnosed with Acute non complicated diverticulitis. Pt has been treated with IV abx, bowel rest. The abdominal pain has resolved. Patient tolerates diet, and remains hemodynamically stable. Patient has  been educated about his diagnosis, counseled  about proper dieting  and is ready for home with follow up plan to see us in clinic within 2 weeks.  Patient is to continue PO abx (cipro/flagyl)  for an additional two weeks.

## 2020-01-27 NOTE — DISCHARGE NOTE PROVIDER - NSDCACTIVITY_GEN_ALL_CORE
Walking - Indoors allowed/Walking - Outdoors allowed/Showering allowed/Stairs allowed/Bathing allowed

## 2020-01-31 DIAGNOSIS — Z79.899 OTHER LONG TERM (CURRENT) DRUG THERAPY: ICD-10-CM

## 2020-01-31 DIAGNOSIS — Z87.442 PERSONAL HISTORY OF URINARY CALCULI: ICD-10-CM

## 2020-01-31 DIAGNOSIS — K57.20 DIVERTICULITIS OF LARGE INTESTINE WITH PERFORATION AND ABSCESS WITHOUT BLEEDING: ICD-10-CM

## 2020-01-31 LAB
CULTURE RESULTS: SIGNIFICANT CHANGE UP
CULTURE RESULTS: SIGNIFICANT CHANGE UP
SPECIMEN SOURCE: SIGNIFICANT CHANGE UP
SPECIMEN SOURCE: SIGNIFICANT CHANGE UP

## 2020-02-12 ENCOUNTER — INPATIENT (INPATIENT)
Facility: HOSPITAL | Age: 45
LOS: 6 days | Discharge: HOME HEALTH | DRG: 853 | End: 2020-02-19
Attending: SURGERY | Admitting: COLON & RECTAL SURGERY
Payer: COMMERCIAL

## 2020-02-12 VITALS — HEIGHT: 72 IN | WEIGHT: 220.02 LBS

## 2020-02-12 DIAGNOSIS — K57.20 DIVERTICULITIS OF LARGE INTESTINE WITH PERFORATION AND ABSCESS WITHOUT BLEEDING: ICD-10-CM

## 2020-02-12 LAB
ALBUMIN SERPL ELPH-MCNC: 3.9 G/DL — SIGNIFICANT CHANGE UP (ref 3.3–5)
ALP SERPL-CCNC: 61 U/L — SIGNIFICANT CHANGE UP (ref 40–120)
ALT FLD-CCNC: 26 U/L — SIGNIFICANT CHANGE UP (ref 12–78)
ANION GAP SERPL CALC-SCNC: 5 MMOL/L — SIGNIFICANT CHANGE UP (ref 5–17)
AST SERPL-CCNC: 26 U/L — SIGNIFICANT CHANGE UP (ref 15–37)
BASOPHILS # BLD AUTO: 0.01 K/UL — SIGNIFICANT CHANGE UP (ref 0–0.2)
BASOPHILS NFR BLD AUTO: 0.1 % — SIGNIFICANT CHANGE UP (ref 0–2)
BILIRUB SERPL-MCNC: 0.4 MG/DL — SIGNIFICANT CHANGE UP (ref 0.2–1.2)
BUN SERPL-MCNC: 15 MG/DL — SIGNIFICANT CHANGE UP (ref 7–23)
CALCIUM SERPL-MCNC: 9.2 MG/DL — SIGNIFICANT CHANGE UP (ref 8.5–10.1)
CHLORIDE SERPL-SCNC: 108 MMOL/L — SIGNIFICANT CHANGE UP (ref 96–108)
CO2 SERPL-SCNC: 27 MMOL/L — SIGNIFICANT CHANGE UP (ref 22–31)
CREAT SERPL-MCNC: 0.92 MG/DL — SIGNIFICANT CHANGE UP (ref 0.5–1.3)
EOSINOPHIL # BLD AUTO: 0.04 K/UL — SIGNIFICANT CHANGE UP (ref 0–0.5)
EOSINOPHIL NFR BLD AUTO: 0.5 % — SIGNIFICANT CHANGE UP (ref 0–6)
GLUCOSE SERPL-MCNC: 89 MG/DL — SIGNIFICANT CHANGE UP (ref 70–99)
HCT VFR BLD CALC: 45.5 % — SIGNIFICANT CHANGE UP (ref 39–50)
HGB BLD-MCNC: 15.3 G/DL — SIGNIFICANT CHANGE UP (ref 13–17)
IMM GRANULOCYTES NFR BLD AUTO: 0.4 % — SIGNIFICANT CHANGE UP (ref 0–1.5)
LACTATE SERPL-SCNC: 1.4 MMOL/L — SIGNIFICANT CHANGE UP (ref 0.7–2)
LIDOCAIN IGE QN: 62 U/L — LOW (ref 73–393)
LYMPHOCYTES # BLD AUTO: 0.73 K/UL — LOW (ref 1–3.3)
LYMPHOCYTES # BLD AUTO: 8.7 % — LOW (ref 13–44)
MCHC RBC-ENTMCNC: 28.8 PG — SIGNIFICANT CHANGE UP (ref 27–34)
MCHC RBC-ENTMCNC: 33.6 GM/DL — SIGNIFICANT CHANGE UP (ref 32–36)
MCV RBC AUTO: 85.5 FL — SIGNIFICANT CHANGE UP (ref 80–100)
MONOCYTES # BLD AUTO: 0.23 K/UL — SIGNIFICANT CHANGE UP (ref 0–0.9)
MONOCYTES NFR BLD AUTO: 2.7 % — SIGNIFICANT CHANGE UP (ref 2–14)
NEUTROPHILS # BLD AUTO: 7.35 K/UL — SIGNIFICANT CHANGE UP (ref 1.8–7.4)
NEUTROPHILS NFR BLD AUTO: 87.6 % — HIGH (ref 43–77)
PLATELET # BLD AUTO: 223 K/UL — SIGNIFICANT CHANGE UP (ref 150–400)
POTASSIUM SERPL-MCNC: 4 MMOL/L — SIGNIFICANT CHANGE UP (ref 3.5–5.3)
POTASSIUM SERPL-SCNC: 4 MMOL/L — SIGNIFICANT CHANGE UP (ref 3.5–5.3)
PROT SERPL-MCNC: 7.4 GM/DL — SIGNIFICANT CHANGE UP (ref 6–8.3)
RBC # BLD: 5.32 M/UL — SIGNIFICANT CHANGE UP (ref 4.2–5.8)
RBC # FLD: 12.7 % — SIGNIFICANT CHANGE UP (ref 10.3–14.5)
SODIUM SERPL-SCNC: 140 MMOL/L — SIGNIFICANT CHANGE UP (ref 135–145)
WBC # BLD: 8.39 K/UL — SIGNIFICANT CHANGE UP (ref 3.8–10.5)
WBC # FLD AUTO: 8.39 K/UL — SIGNIFICANT CHANGE UP (ref 3.8–10.5)

## 2020-02-12 PROCEDURE — 74177 CT ABD & PELVIS W/CONTRAST: CPT | Mod: 26

## 2020-02-12 PROCEDURE — 82962 GLUCOSE BLOOD TEST: CPT

## 2020-02-12 PROCEDURE — C1765: CPT

## 2020-02-12 PROCEDURE — 93005 ELECTROCARDIOGRAM TRACING: CPT

## 2020-02-12 PROCEDURE — 93010 ELECTROCARDIOGRAM REPORT: CPT

## 2020-02-12 PROCEDURE — 86901 BLOOD TYPING SEROLOGIC RH(D): CPT

## 2020-02-12 PROCEDURE — 74018 RADEX ABDOMEN 1 VIEW: CPT

## 2020-02-12 PROCEDURE — 84100 ASSAY OF PHOSPHORUS: CPT

## 2020-02-12 PROCEDURE — 87070 CULTURE OTHR SPECIMN AEROBIC: CPT

## 2020-02-12 PROCEDURE — 36415 COLL VENOUS BLD VENIPUNCTURE: CPT

## 2020-02-12 PROCEDURE — 86900 BLOOD TYPING SEROLOGIC ABO: CPT

## 2020-02-12 PROCEDURE — 86850 RBC ANTIBODY SCREEN: CPT

## 2020-02-12 PROCEDURE — 85610 PROTHROMBIN TIME: CPT

## 2020-02-12 PROCEDURE — 44143 PARTIAL REMOVAL OF COLON: CPT

## 2020-02-12 PROCEDURE — 87102 FUNGUS ISOLATION CULTURE: CPT

## 2020-02-12 PROCEDURE — 80053 COMPREHEN METABOLIC PANEL: CPT

## 2020-02-12 PROCEDURE — 80048 BASIC METABOLIC PNL TOTAL CA: CPT

## 2020-02-12 PROCEDURE — C1889: CPT

## 2020-02-12 PROCEDURE — 85730 THROMBOPLASTIN TIME PARTIAL: CPT

## 2020-02-12 PROCEDURE — 87075 CULTR BACTERIA EXCEPT BLOOD: CPT

## 2020-02-12 PROCEDURE — 87186 SC STD MICRODIL/AGAR DIL: CPT

## 2020-02-12 PROCEDURE — 88307 TISSUE EXAM BY PATHOLOGIST: CPT

## 2020-02-12 PROCEDURE — 85027 COMPLETE CBC AUTOMATED: CPT

## 2020-02-12 PROCEDURE — 83735 ASSAY OF MAGNESIUM: CPT

## 2020-02-12 RX ORDER — ACETAMINOPHEN 500 MG
1000 TABLET ORAL ONCE
Refills: 0 | Status: COMPLETED | OUTPATIENT
Start: 2020-02-12 | End: 2020-02-12

## 2020-02-12 RX ORDER — TAMSULOSIN HYDROCHLORIDE 0.4 MG/1
0.4 CAPSULE ORAL AT BEDTIME
Refills: 0 | Status: DISCONTINUED | OUTPATIENT
Start: 2020-02-12 | End: 2020-02-19

## 2020-02-12 RX ORDER — PIPERACILLIN AND TAZOBACTAM 4; .5 G/20ML; G/20ML
3.38 INJECTION, POWDER, LYOPHILIZED, FOR SOLUTION INTRAVENOUS ONCE
Refills: 0 | Status: COMPLETED | OUTPATIENT
Start: 2020-02-12 | End: 2020-02-12

## 2020-02-12 RX ORDER — ONDANSETRON 8 MG/1
4 TABLET, FILM COATED ORAL EVERY 6 HOURS
Refills: 0 | Status: DISCONTINUED | OUTPATIENT
Start: 2020-02-12 | End: 2020-02-19

## 2020-02-12 RX ORDER — ENOXAPARIN SODIUM 100 MG/ML
40 INJECTION SUBCUTANEOUS DAILY
Refills: 0 | Status: DISCONTINUED | OUTPATIENT
Start: 2020-02-12 | End: 2020-02-19

## 2020-02-12 RX ORDER — SODIUM CHLORIDE 9 MG/ML
1000 INJECTION INTRAMUSCULAR; INTRAVENOUS; SUBCUTANEOUS
Refills: 0 | Status: DISCONTINUED | OUTPATIENT
Start: 2020-02-12 | End: 2020-02-14

## 2020-02-12 RX ORDER — SODIUM CHLORIDE 9 MG/ML
1000 INJECTION, SOLUTION INTRAVENOUS
Refills: 0 | Status: DISCONTINUED | OUTPATIENT
Start: 2020-02-12 | End: 2020-02-12

## 2020-02-12 RX ORDER — ACETAMINOPHEN 500 MG
650 TABLET ORAL EVERY 6 HOURS
Refills: 0 | Status: DISCONTINUED | OUTPATIENT
Start: 2020-02-12 | End: 2020-02-19

## 2020-02-12 RX ORDER — KETOROLAC TROMETHAMINE 30 MG/ML
15 SYRINGE (ML) INJECTION EVERY 8 HOURS
Refills: 0 | Status: DISCONTINUED | OUTPATIENT
Start: 2020-02-12 | End: 2020-02-17

## 2020-02-12 RX ORDER — ONDANSETRON 8 MG/1
4 TABLET, FILM COATED ORAL ONCE
Refills: 0 | Status: COMPLETED | OUTPATIENT
Start: 2020-02-12 | End: 2020-02-12

## 2020-02-12 RX ORDER — SODIUM CHLORIDE 9 MG/ML
1000 INJECTION INTRAMUSCULAR; INTRAVENOUS; SUBCUTANEOUS ONCE
Refills: 0 | Status: COMPLETED | OUTPATIENT
Start: 2020-02-12 | End: 2020-02-12

## 2020-02-12 RX ORDER — PIPERACILLIN AND TAZOBACTAM 4; .5 G/20ML; G/20ML
3.38 INJECTION, POWDER, LYOPHILIZED, FOR SOLUTION INTRAVENOUS EVERY 8 HOURS
Refills: 0 | Status: COMPLETED | OUTPATIENT
Start: 2020-02-12 | End: 2020-02-12

## 2020-02-12 RX ORDER — ERTAPENEM SODIUM 1 G/1
1000 INJECTION, POWDER, LYOPHILIZED, FOR SOLUTION INTRAMUSCULAR; INTRAVENOUS EVERY 24 HOURS
Refills: 0 | Status: DISCONTINUED | OUTPATIENT
Start: 2020-02-13 | End: 2020-02-19

## 2020-02-12 RX ORDER — MORPHINE SULFATE 50 MG/1
4 CAPSULE, EXTENDED RELEASE ORAL EVERY 4 HOURS
Refills: 0 | Status: DISCONTINUED | OUTPATIENT
Start: 2020-02-12 | End: 2020-02-13

## 2020-02-12 RX ADMIN — ONDANSETRON 4 MILLIGRAM(S): 8 TABLET, FILM COATED ORAL at 10:24

## 2020-02-12 RX ADMIN — SODIUM CHLORIDE 120 MILLILITER(S): 9 INJECTION, SOLUTION INTRAVENOUS at 15:45

## 2020-02-12 RX ADMIN — MORPHINE SULFATE 4 MILLIGRAM(S): 50 CAPSULE, EXTENDED RELEASE ORAL at 15:54

## 2020-02-12 RX ADMIN — SODIUM CHLORIDE 1000 MILLILITER(S): 9 INJECTION INTRAMUSCULAR; INTRAVENOUS; SUBCUTANEOUS at 10:24

## 2020-02-12 RX ADMIN — PIPERACILLIN AND TAZOBACTAM 200 GRAM(S): 4; .5 INJECTION, POWDER, LYOPHILIZED, FOR SOLUTION INTRAVENOUS at 12:35

## 2020-02-12 RX ADMIN — SODIUM CHLORIDE 1000 MILLILITER(S): 9 INJECTION INTRAMUSCULAR; INTRAVENOUS; SUBCUTANEOUS at 11:24

## 2020-02-12 RX ADMIN — Medication 15 MILLIGRAM(S): at 12:52

## 2020-02-12 RX ADMIN — Medication 1000 MILLIGRAM(S): at 10:39

## 2020-02-12 RX ADMIN — Medication 400 MILLIGRAM(S): at 21:06

## 2020-02-12 RX ADMIN — Medication 1000 MILLIGRAM(S): at 21:37

## 2020-02-12 RX ADMIN — MORPHINE SULFATE 4 MILLIGRAM(S): 50 CAPSULE, EXTENDED RELEASE ORAL at 19:45

## 2020-02-12 RX ADMIN — Medication 650 MILLIGRAM(S): at 15:53

## 2020-02-12 RX ADMIN — Medication 15 MILLIGRAM(S): at 21:36

## 2020-02-12 RX ADMIN — MORPHINE SULFATE 4 MILLIGRAM(S): 50 CAPSULE, EXTENDED RELEASE ORAL at 14:46

## 2020-02-12 RX ADMIN — SODIUM CHLORIDE 150 MILLILITER(S): 9 INJECTION INTRAMUSCULAR; INTRAVENOUS; SUBCUTANEOUS at 22:50

## 2020-02-12 RX ADMIN — Medication 15 MILLIGRAM(S): at 12:37

## 2020-02-12 RX ADMIN — Medication 650 MILLIGRAM(S): at 15:45

## 2020-02-12 RX ADMIN — Medication 15 MILLIGRAM(S): at 21:07

## 2020-02-12 RX ADMIN — TAMSULOSIN HYDROCHLORIDE 0.4 MILLIGRAM(S): 0.4 CAPSULE ORAL at 21:09

## 2020-02-12 RX ADMIN — MORPHINE SULFATE 4 MILLIGRAM(S): 50 CAPSULE, EXTENDED RELEASE ORAL at 19:32

## 2020-02-12 RX ADMIN — Medication 400 MILLIGRAM(S): at 10:24

## 2020-02-12 RX ADMIN — SODIUM CHLORIDE 1000 MILLILITER(S): 9 INJECTION INTRAMUSCULAR; INTRAVENOUS; SUBCUTANEOUS at 21:01

## 2020-02-12 RX ADMIN — PIPERACILLIN AND TAZOBACTAM 25 GRAM(S): 4; .5 INJECTION, POWDER, LYOPHILIZED, FOR SOLUTION INTRAVENOUS at 22:27

## 2020-02-12 NOTE — ED ADULT NURSE NOTE - NSIMPLEMENTINTERV_GEN_ALL_ED
Implemented All Universal Safety Interventions:  [place] to call system. Call bell, personal items and telephone within reach. Instruct patient to call for assistance. Room bathroom lighting operational. Non-slip footwear when patient is off stretcher. Physically safe environment: no spills, clutter or unnecessary equipment. Stretcher in lowest position, wheels locked, appropriate side rails in place.

## 2020-02-12 NOTE — H&P ADULT - ASSESSMENT
44M with 2nd Episode of Sigmoid Diverticulitis.  - Admit to Dr. Vandana Neri Colorectal surgery service  - IVF, IV abx (Zosyn), NPO  - Serial abdominal exams  - ID consult  - Likely will need midline IV access  - DVT ppx  - Home meds    will discuss with attending, Dr. Neri.

## 2020-02-12 NOTE — ED ADULT NURSE NOTE - CHIEF COMPLAINT QUOTE
Patient complains of abdominal pain with nausea starting this morning. Hx diverticulitis, admitted 1/26/20

## 2020-02-12 NOTE — H&P ADULT - ATTENDING COMMENTS
Seen and examined.  Pt known to me from prior hospitalization at end of 1/2020. First episode acute sigmoid diverticulitis with contained mesenteric perforation without drainable collection. He was given IV abx for 2 days with bowel rest, then transitioned to po abx (cipro/flagyl) for 10 days. He reports 2 weeks of feeling well after completion of oral abx. Sudden onset of pain yesterday afternoon, though reports that the pain is less severe than the prior episode. Seen in ED and found to be tachycardic but responsive to fluids. Is afebrile with normal WBC. Repeat CT demonstrates improved but continued sigmoid inflammation and now noted small foci of air within mesentery and in anterior abdominal wall.   NAD  abd soft, tender mostly in lower abdomen, moderate distention  Labs reviewed. Lactate normal  CT images and report reviewed    A/P - Recurrent sigmoid diverticulitis   Despite CT findings, pt without fevers, leukocytosis or elevated lactate and reports abdominal pain not as significant as prior admission.  For now, will keep NPO with IV abx and follow vitals, abdominal exam and labwork.  He is aware that should he deteriorate clinically in terms of abdominal pain, vital signs or labwork, we will need to proceed with laparoscopic possible open Hartmanns procedure.  If improves over next 24 hrs, anticipate continued IV abx as outpt with assistance of ID team with interval elective one stage sigmoid resection given frequency and severity of episodes.  Pt understands and is agreeable.

## 2020-02-12 NOTE — ED ADULT TRIAGE NOTE - CHIEF COMPLAINT QUOTE
Patient complains of abdominal pain with nausea starting this morning. Hx diverticulitis Patient complains of abdominal pain with nausea starting this morning. Hx diverticulitis, admitted 1/26/20

## 2020-02-12 NOTE — H&P ADULT - HISTORY OF PRESENT ILLNESS
44M who presents with chief complaint of Lower abdominal pain that began yesterday evening.  Patient admits pain 7/10 in intensity and has progressively worsened since yesterday afternoon.  Patient was seen and hospitalized for similar pain on 1/25/2020 at  with diagnosis of Diverticulitis.  He was treated with IV abx for 2-3 days as inpatient and completed a 2 week course of Cipro/Flagyl. Patient denies nausea, vomiting, fevers, chills, shortness of breath, chest pain, hematochezia, hematemesis.     PMH:  Diverticulitis  PSH:  Denies  NKDA

## 2020-02-12 NOTE — CONSULT NOTE ADULT - ASSESSMENT
43 y/o Male with no significant PMH was admitted on 2/12 for lower abdominal pain x one day. The patient developed abdomina pain 3 weeks PTA and workup showed acute diverticulitis. He was treated wit hIV abx for 3 days followed by oral cipo and metronidazole for 2 more weeks. His symptoms resolved and he was well for another week after he completed his oral abx. His abdominal pain recurred the day PTA. Patient admits pain 7/10 in intensity and has progressively worsening. Patient denies nausea, vomiting, fevers, chills. In ER he received zosyn.     1. Low grade fever. Acute sigmoid, recurrent diverticulitis with intramural abscess. Acute peritonitis.  -abdominal pain  -surgical evaluation appreciated  -obtain BC x 2  -given zosyn IV for now  -NPO  -monitor abdomen closely  -change abx to ertapenem 1 gm IV qd  -reason for abx use and side effects reviewed with patient; monitor BMP   -conservative management for now per surgery  -old chart reviewed to assess prior cultures  -corazon management  -monitor temps  -f/u CBC  -supportive care  2. Other issues:   -care per medicine

## 2020-02-12 NOTE — CONSULT NOTE ADULT - SUBJECTIVE AND OBJECTIVE BOX
Patient is a 44y old  Male who presents with a chief complaint of Diverticulitis     HPI:  45 y/o Male with no significant PMH was admitted on 2/12 for lower abdominal pain x one day. The patient developed abdomina pain 3 weeks PTA and workup showed acute diverticulitis. He was treated wit hIV abx for 3 days followed by oral cipo and metronidazole for 2 more weeks. His symptoms resolved and he was well for another week after he completed his oral abx. His abdominal pain recurred the day PTA. Patient admits pain 7/10 in intensity and has progressively worsening. Patient denies nausea, vomiting, fevers, chills. In ER he received zosyn.     PMH: as above  Patient was seen and hospitalized for similar pain on 1/25/2020 at  with diagnosis of Diverticulitis.  He was treated with IV abx for 2-3 days as inpatient and completed a 2 week course of Cipro/Flagyl.   PSH: as above  Meds: per reconciliation sheet, noted below  MEDICATIONS  (STANDING):  enoxaparin Injectable 40 milliGRAM(s) SubCutaneous daily  lactated ringers. 1000 milliLiter(s) (120 mL/Hr) IV Continuous <Continuous>  piperacillin/tazobactam IVPB.. 3.375 Gram(s) IV Intermittent every 8 hours  tamsulosin 0.4 milliGRAM(s) Oral at bedtime    MEDICATIONS  (PRN):  acetaminophen   Tablet .. 650 milliGRAM(s) Oral every 6 hours PRN Temp greater or equal to 38C (100.4F), Mild Pain (1 - 3)  ketorolac   Injectable 15 milliGRAM(s) IV Push every 8 hours PRN Moderate Pain (4 - 6)  morphine  - Injectable 4 milliGRAM(s) IV Push every 4 hours PRN Severe Pain (7 - 10)  ondansetron Injectable 4 milliGRAM(s) IV Push every 6 hours PRN Nausea and/or Vomiting    Allergies    No Known Allergies    Intolerances      Social: no smoking, no alcohol, no illegal drugs; no recent travel, no exposure to TB  FAMILY HISTORY:  No pertinent family history in first degree relatives    no history of premature cardiovascular disease in first degree relatives    ROS: the patient denies fever, no chills, no HA, no seizures, no dizziness, no sore throat, no nasal congestion, no blurry vision, no CP, no palpitations, no SOB, no cough, has abdominal pain, no diarrhea, no N/V, no dysuria, no leg pain, no claudication, no rash, no joint aches, no rectal pain or bleeding, no night sweats  All other systems reviewed and are negative    Vital Signs Last 24 Hrs  T(C): 38.3 (12 Feb 2020 14:40), Max: 38.3 (12 Feb 2020 14:40)  T(F): 100.9 (12 Feb 2020 14:40), Max: 100.9 (12 Feb 2020 14:40)  HR: 99 (12 Feb 2020 14:40) (99 - 127)  BP: 131/71 (12 Feb 2020 14:40) (124/76 - 139/94)  BP(mean): --  RR: 18 (12 Feb 2020 14:40) (18 - 19)  SpO2: 100% (12 Feb 2020 14:40) (99% - 100%)  Daily Height in cm: 182.88 (12 Feb 2020 09:20)    Daily     PE:    Constitutional:  No acute distress  HEENT: NC/AT, EOMI, PERRLA, conjunctivae clear; ears and nose atraumatic; pharynx benign  Neck: supple; thyroid not palpable  Back: no tenderness  Respiratory: respiratory effort normal; clear to auscultation  Cardiovascular: S1S2 regular, no murmurs  Abdomen: soft, lower abdomen tender, not distended, positive BS; no liver or spleen organomegaly  Genitourinary: no suprapubic tenderness  Lymphatic: no LN palpable  Musculoskeletal: no muscle tenderness, no joint swelling or tenderness  Extremities: no pedal edema  Neurological/ Psychiatric: AxOx3, judgement and insight normal; moving all extremities  Skin: no rashes; no palpable lesions    Labs: all available labs reviewed                        15.3   8.39  )-----------( 223      ( 12 Feb 2020 09:59 )             45.5     02-12    140  |  108  |  15  ----------------------------<  89  4.0   |  27  |  0.92    Ca    9.2      12 Feb 2020 09:59    TPro  7.4  /  Alb  3.9  /  TBili  0.4  /  DBili  x   /  AST  26  /  ALT  26  /  AlkPhos  61  02-12     LIVER FUNCTIONS - ( 12 Feb 2020 09:59 )  Alb: 3.9 g/dL / Pro: 7.4 gm/dL / ALK PHOS: 61 U/L / ALT: 26 U/L / AST: 26 U/L / GGT: x             Radiology: all available radiological tests reviewed    < from: CT Abdomen and Pelvis w/ IV Cont (02.12.20 @ 10:17) >  Diverticulitis involving the mid sigmoid colon with an interval improvement in inflammatory changes adjacent to the mid sigmoid colon since 1/25/2020.  Droplets of free air adjacent to the sigmoid colon and additional droplets of free air in the upper abdomen; free air previously not seen in the upper abdomen on 1/25/2020.  New small amount of ascites.    < end of copied text >      Advanced directives addressed: full resuscitation

## 2020-02-12 NOTE — H&P ADULT - NSHPPHYSICALEXAM_GEN_ALL_CORE
NAD, AOx3  S1 s2  CTAB  abdomen tender throughout lower quadrants, mild rebounding, no previous incisions, no evidence of hernia, nondistended.

## 2020-02-12 NOTE — H&P ADULT - NSHPLABSRESULTS_GEN_ALL_CORE
15.3   8.39  )-----------( 223      ( 12 Feb 2020 09:59 )             45.5     02-12    140  |  108  |  15  ----------------------------<  89  4.0   |  27  |  0.92    Ca    9.2      12 Feb 2020 09:59    TPro  7.4  /  Alb  3.9  /  TBili  0.4  /  DBili  x   /  AST  26  /  ALT  26  /  AlkPhos  61  02-12    Lactate:  1.4 15.3   8.39  )-----------( 223      ( 12 Feb 2020 09:59 )             45.5     02-12    140  |  108  |  15  ----------------------------<  89  4.0   |  27  |  0.92    Ca    9.2      12 Feb 2020 09:59    TPro  7.4  /  Alb  3.9  /  TBili  0.4  /  DBili  x   /  AST  26  /  ALT  26  /  AlkPhos  61  02-12    Lactate:  1.4  < from: CT Abdomen and Pelvis w/ IV Cont (02.12.20 @ 10:17) >    EXAM:  CT ABDOMEN AND PELVIS IC                            PROCEDURE DATE:  02/12/2020          INTERPRETATION:  CLINICAL INFORMATION: Diffuse abdominal pain. Recent diverticulitis with perforation.    COMPARISON: CT abdomen/pelvis 1/25/2020, 10/26/2018, and 12/23/2011    PROCEDURE:   CT of the Abdomen and Pelvis was performed with intravenous contrast.   Intravenous contrast: 90 ml Omnipaque 350. 10 ml discarded.  Oral contrast: positive contrast was administered.  Sagittal and coronal reformats were performed.    FINDINGS:    LOWER CHEST: Dependent changes/atelectasis at the lung bases. Small hiatal hernia.    LIVER: No ascites. Subcentimeter low-attenuation lesion in the right hepatic lobe, too small to characterize.  BILE DUCTS: Normal caliber.  GALLBLADDER: Within normal limits.  SPLEEN: Within normal limits.  PANCREAS: Within normal limits.  ADRENALS: Cystic right adrenal mass with peripheral calcifications, unchanged in size since 10/26/2018 measuring 4.5 cm, previously 2.7 cm on12/23/2011.  KIDNEYS/URETERS: Kidneys normal in size. No hydronephrosis. Subcentimeter low-attenuation lesion in each kidney, too small to characterize, however likely cysts.    BLADDER: Within normal limits.  REPRODUCTIVE ORGANS: Prostate not enlarged.    BOWEL: Inflammatory changes are again seen adjacent to the mid sigmoid colon consistent with diverticulitis with the degree of inflammatory change improved since 1/25/2020 including resolution of a previously seen collection of fluid and air adjacent to the sigmoid colon with residual infiltration of the fat/phlegmon. There is additional infiltration of the fat and ascites in the adjacent mesentery. There is a small amount of adjacent ascites, new. There are small droplets of free air adjacent to the sigmoid colon and additional small droplets of free air at the anterior aspect of the upper abdomen and in the small bowel mesentery (for example series 2 images 50, 42, and 43); previously air only seen in the region of the sigmoid colon. Appendix is normal.  PERITONEUM: See above. There is also mild of the mesenteric fat adjacent to small bowel loops in the left hemiabdomen, likely related to the diverticulitis.  VESSELS: Abdominal aorta normal in caliber.  RETROPERITONEUM/LYMPH NODES: No lymphadenopathy.    ABDOMINAL WALL: Diastases of the anterior abdominal wall.  BONES: Degenerative changes in the spine.    IMPRESSION:     Diverticulitis involving the mid sigmoid colon with an interval improvement in inflammatory changes adjacent to the mid sigmoid colon since 1/25/2020.    Droplets of free air adjacent to the sigmoid colon and additional droplets of free air in the upper abdomen; free air previously not seen in the upper abdomen on 1/25/2020.    New small amount of ascites.    The findings were discussed with Dr. Sampson at 10:40 AM on 2/12/2020.                WILLY JOHNSON   This document has been electronically signed. Feb 12 2020 10:50AM                < end of copied text >

## 2020-02-12 NOTE — ED ADULT NURSE REASSESSMENT NOTE - NS ED NURSE REASSESS COMMENT FT1
Hourly rounding completed. Pt given toradol IVP for pain relief. Pt started on zosyn abx, as per MD Peters, no abx required as source of infection is known. RN to continue to monitor.

## 2020-02-12 NOTE — ED PROVIDER NOTE - CONSTITUTIONAL, MLM
normal... Well appearing, awake, alert, oriented to person, place, time/situation and in no apparent distress. +diaphoretic

## 2020-02-12 NOTE — ED PROVIDER NOTE - OBJECTIVE STATEMENT
43 y/o male with a PMHx of kidney stones, diverticulitis presents to the ED c/o lower abd pain since last night, described as pressure, non-radiating. +abd distension. Denies diarrhea, nausea, vomiting, black or bloody stool, fever, chills, CP, SOB. No change in diet or appetite. Pt was recently admitted to  from 1/25/2020-1/26/2020 for sigmoid diverticulitis with microperforation, treated with IV abx and discharged with PO cipro/flagyl. Pt has appt with colorectal Dr. Neri tomorrow.

## 2020-02-13 ENCOUNTER — APPOINTMENT (OUTPATIENT)
Dept: COLORECTAL SURGERY | Facility: CLINIC | Age: 45
End: 2020-02-13

## 2020-02-13 ENCOUNTER — RESULT REVIEW (OUTPATIENT)
Age: 45
End: 2020-02-13

## 2020-02-13 LAB
APTT BLD: 28.8 SEC — SIGNIFICANT CHANGE UP (ref 27.5–36.3)
INR BLD: 1.62 RATIO — HIGH (ref 0.88–1.16)
PROTHROM AB SERPL-ACNC: 18.2 SEC — HIGH (ref 10–12.9)

## 2020-02-13 PROCEDURE — 88307 TISSUE EXAM BY PATHOLOGIST: CPT | Mod: 26

## 2020-02-13 PROCEDURE — 99232 SBSQ HOSP IP/OBS MODERATE 35: CPT | Mod: 24,57

## 2020-02-13 RX ORDER — HYDROMORPHONE HYDROCHLORIDE 2 MG/ML
1 INJECTION INTRAMUSCULAR; INTRAVENOUS; SUBCUTANEOUS EVERY 4 HOURS
Refills: 0 | Status: DISCONTINUED | OUTPATIENT
Start: 2020-02-13 | End: 2020-02-19

## 2020-02-13 RX ORDER — SODIUM CHLORIDE 9 MG/ML
1000 INJECTION INTRAMUSCULAR; INTRAVENOUS; SUBCUTANEOUS
Refills: 0 | Status: DISCONTINUED | OUTPATIENT
Start: 2020-02-13 | End: 2020-02-13

## 2020-02-13 RX ORDER — ACETAMINOPHEN 500 MG
1000 TABLET ORAL ONCE
Refills: 0 | Status: COMPLETED | OUTPATIENT
Start: 2020-02-13 | End: 2020-02-13

## 2020-02-13 RX ORDER — HYDROMORPHONE HYDROCHLORIDE 2 MG/ML
0.5 INJECTION INTRAMUSCULAR; INTRAVENOUS; SUBCUTANEOUS
Refills: 0 | Status: DISCONTINUED | OUTPATIENT
Start: 2020-02-13 | End: 2020-02-13

## 2020-02-13 RX ORDER — FENTANYL CITRATE 50 UG/ML
50 INJECTION INTRAVENOUS
Refills: 0 | Status: DISCONTINUED | OUTPATIENT
Start: 2020-02-13 | End: 2020-02-13

## 2020-02-13 RX ORDER — OXYCODONE HYDROCHLORIDE 5 MG/1
5 TABLET ORAL ONCE
Refills: 0 | Status: DISCONTINUED | OUTPATIENT
Start: 2020-02-13 | End: 2020-02-13

## 2020-02-13 RX ORDER — ONDANSETRON 8 MG/1
4 TABLET, FILM COATED ORAL ONCE
Refills: 0 | Status: DISCONTINUED | OUTPATIENT
Start: 2020-02-13 | End: 2020-02-13

## 2020-02-13 RX ADMIN — HYDROMORPHONE HYDROCHLORIDE 1 MILLIGRAM(S): 2 INJECTION INTRAMUSCULAR; INTRAVENOUS; SUBCUTANEOUS at 20:59

## 2020-02-13 RX ADMIN — HYDROMORPHONE HYDROCHLORIDE 0.5 MILLIGRAM(S): 2 INJECTION INTRAMUSCULAR; INTRAVENOUS; SUBCUTANEOUS at 18:37

## 2020-02-13 RX ADMIN — SODIUM CHLORIDE 150 MILLILITER(S): 9 INJECTION INTRAMUSCULAR; INTRAVENOUS; SUBCUTANEOUS at 05:37

## 2020-02-13 RX ADMIN — FENTANYL CITRATE 50 MICROGRAM(S): 50 INJECTION INTRAVENOUS at 16:27

## 2020-02-13 RX ADMIN — HYDROMORPHONE HYDROCHLORIDE 0.5 MILLIGRAM(S): 2 INJECTION INTRAMUSCULAR; INTRAVENOUS; SUBCUTANEOUS at 19:07

## 2020-02-13 RX ADMIN — SODIUM CHLORIDE 150 MILLILITER(S): 9 INJECTION INTRAMUSCULAR; INTRAVENOUS; SUBCUTANEOUS at 22:42

## 2020-02-13 RX ADMIN — MORPHINE SULFATE 4 MILLIGRAM(S): 50 CAPSULE, EXTENDED RELEASE ORAL at 11:35

## 2020-02-13 RX ADMIN — HYDROMORPHONE HYDROCHLORIDE 1 MILLIGRAM(S): 2 INJECTION INTRAMUSCULAR; INTRAVENOUS; SUBCUTANEOUS at 20:44

## 2020-02-13 RX ADMIN — FENTANYL CITRATE 50 MICROGRAM(S): 50 INJECTION INTRAVENOUS at 18:36

## 2020-02-13 RX ADMIN — Medication 15 MILLIGRAM(S): at 22:50

## 2020-02-13 RX ADMIN — MORPHINE SULFATE 4 MILLIGRAM(S): 50 CAPSULE, EXTENDED RELEASE ORAL at 02:42

## 2020-02-13 RX ADMIN — MORPHINE SULFATE 4 MILLIGRAM(S): 50 CAPSULE, EXTENDED RELEASE ORAL at 11:07

## 2020-02-13 RX ADMIN — Medication 1 MILLIGRAM(S): at 21:30

## 2020-02-13 RX ADMIN — Medication 400 MILLIGRAM(S): at 16:16

## 2020-02-13 RX ADMIN — Medication 15 MILLIGRAM(S): at 05:45

## 2020-02-13 RX ADMIN — ERTAPENEM SODIUM 120 MILLIGRAM(S): 1 INJECTION, POWDER, LYOPHILIZED, FOR SOLUTION INTRAMUSCULAR; INTRAVENOUS at 02:56

## 2020-02-13 RX ADMIN — Medication 650 MILLIGRAM(S): at 05:37

## 2020-02-13 RX ADMIN — Medication 1000 MILLIGRAM(S): at 16:46

## 2020-02-13 RX ADMIN — Medication 15 MILLIGRAM(S): at 23:05

## 2020-02-13 RX ADMIN — HYDROMORPHONE HYDROCHLORIDE 0.5 MILLIGRAM(S): 2 INJECTION INTRAMUSCULAR; INTRAVENOUS; SUBCUTANEOUS at 19:32

## 2020-02-13 RX ADMIN — MORPHINE SULFATE 4 MILLIGRAM(S): 50 CAPSULE, EXTENDED RELEASE ORAL at 03:06

## 2020-02-13 RX ADMIN — Medication 15 MILLIGRAM(S): at 05:31

## 2020-02-13 RX ADMIN — FENTANYL CITRATE 50 MICROGRAM(S): 50 INJECTION INTRAVENOUS at 16:57

## 2020-02-13 RX ADMIN — SODIUM CHLORIDE 150 MILLILITER(S): 9 INJECTION INTRAMUSCULAR; INTRAVENOUS; SUBCUTANEOUS at 11:08

## 2020-02-13 RX ADMIN — MORPHINE SULFATE 4 MILLIGRAM(S): 50 CAPSULE, EXTENDED RELEASE ORAL at 17:48

## 2020-02-13 RX ADMIN — FENTANYL CITRATE 50 MICROGRAM(S): 50 INJECTION INTRAVENOUS at 16:39

## 2020-02-13 RX ADMIN — MORPHINE SULFATE 4 MILLIGRAM(S): 50 CAPSULE, EXTENDED RELEASE ORAL at 17:18

## 2020-02-13 RX ADMIN — FENTANYL CITRATE 50 MICROGRAM(S): 50 INJECTION INTRAVENOUS at 18:06

## 2020-02-13 RX ADMIN — HYDROMORPHONE HYDROCHLORIDE 0.5 MILLIGRAM(S): 2 INJECTION INTRAMUSCULAR; INTRAVENOUS; SUBCUTANEOUS at 19:45

## 2020-02-13 RX ADMIN — Medication 650 MILLIGRAM(S): at 06:00

## 2020-02-13 RX ADMIN — FENTANYL CITRATE 50 MICROGRAM(S): 50 INJECTION INTRAVENOUS at 17:09

## 2020-02-13 NOTE — PROGRESS NOTE ADULT - SUBJECTIVE AND OBJECTIVE BOX
Patient seen and examined at bedside on am rounds with the surgery team.   Febrile overnight to 101.8F.  Ice pack to forehead on interview this am.  Continues to have pain in LLQ of abdomen.  Denies nausea, vomiting, fevers.    Admits flatus and BM normal.      ICU Vital Signs Last 24 Hrs  T(C): 37.8 (13 Feb 2020 05:08), Max: 38.8 (12 Feb 2020 17:18)  T(F): 100 (13 Feb 2020 05:08), Max: 101.8 (12 Feb 2020 17:18)  HR: 112 (13 Feb 2020 05:08) (99 - 127)  BP: 109/55 (13 Feb 2020 05:08) (104/51 - 139/94)  BP(mean): --  ABP: --  ABP(mean): --  RR: 19 (13 Feb 2020 05:08) (18 - 19)  SpO2: 96% (13 Feb 2020 05:08) (94% - 100%)    NAD, AOx3  S1 s2  CTAB  soft, tender throughout abdomen with LLQ moderate tenderness, no guarding, minimal distension.  no edema.                          15.3   8.39  )-----------( 223      ( 12 Feb 2020 09:59 )             45.5     02-12    140  |  108  |  15  ----------------------------<  89  4.0   |  27  |  0.92    Ca    9.2      12 Feb 2020 09:59    TPro  7.4  /  Alb  3.9  /  TBili  0.4  /  DBili  x   /  AST  26  /  ALT  26  /  AlkPhos  61  02-12

## 2020-02-13 NOTE — PHARMACOTHERAPY INTERVENTION NOTE - COMMENTS
As per hospital policy, cannot have duplicate therapy/pain parameters. Spoke with Dr. Waller and discontinued morphine. Discontinued Fentanyl due to hospital policy. Spoke with CHARLES Szymanski, patient is doing well on dilaudid 0.5mg IV push.

## 2020-02-13 NOTE — BRIEF OPERATIVE NOTE - NSICDXBRIEFPROCEDURE_GEN_ALL_CORE_FT
PROCEDURES:  Abdominal washout 13-Feb-2020 15:55:50  DIEGO CRUZ  Sherice's procedure 13-Feb-2020 15:55:06  DIEGO CRUZ

## 2020-02-13 NOTE — PROGRESS NOTE ADULT - ASSESSMENT
44M with 2nd Episode of Sigmoid Diverticulitis.  Evidence of Microperforations on CT and remains clinically tender, Febrile overnight.  - Continue IVF, IV abx (Zosyn), NPO  - Serial abdominal exams  - ID consult appreciated.  - Likely will need midline IV access  - DVT ppx  - Home meds  - Will discuss possibility of OR today for Hartmans    will discuss with attending, Dr. Neri.

## 2020-02-13 NOTE — PROGRESS NOTE ADULT - ATTENDING COMMENTS
Seen and examined.  Febrile overnight Tm 101.8 with leukocytosis despite Zosyn and Invanz. Tachycardic despite IVF at 150/hr and BPs are marginally low.  Reports abdominal pain is slightly improved.  Slightly uncomfortable appearing  Soft, tender with deep palpation, moderate distention   Labs reviewed  A/P - Sigmoid diverticulitis with microperforation, worsening clinical course c/w impending sepsis    Discussed with patient, wife at bedside and pt mother over the phone, that given constellation of above, recommend urgent exploration and given anticipation of purulent peritonitis, anticipate Hartmanns procedure.  R/B/A reviewed.  On call to OR urgently.

## 2020-02-13 NOTE — ADVANCED PRACTICE NURSE CONSULT - ASSESSMENT
This is a 44 year old male that was admitted to the hospital on 2/12/2020 for abdominal pain. No PMH.     Consulted by Dr. Neri to marcia patient's abdomen prior to surgery today.     Introduced self to patient and family and the purpose of the consult. Patient noted to be in pain to abdomen and family members noted to be anxious due to patients condition. Patient and family both declining any education at this time.    Patient's abdominal topography then assessed in a sitting and laying position. Abdominal rectus muscle palpated. Patient's waistline also taken into consideration. Abdominal contour noted to be distended and tender to touch. No abdominal creases noted. No scars noted. Patient's abdomen marked to the LLQ 2 inches away from the umbilicus. Patient is able to view marking when sitting and lying. Marking is made on the apex of the infraumbilical bulge. Patient educated that the surgeon will make the final decision where the stoma will be placed in the OR. Patient denies any questions at this time.

## 2020-02-14 LAB
ANION GAP SERPL CALC-SCNC: 4 MMOL/L — LOW (ref 5–17)
BUN SERPL-MCNC: 12 MG/DL — SIGNIFICANT CHANGE UP (ref 7–23)
CALCIUM SERPL-MCNC: 7.7 MG/DL — LOW (ref 8.5–10.1)
CHLORIDE SERPL-SCNC: 111 MMOL/L — HIGH (ref 96–108)
CO2 SERPL-SCNC: 25 MMOL/L — SIGNIFICANT CHANGE UP (ref 22–31)
CREAT SERPL-MCNC: 0.8 MG/DL — SIGNIFICANT CHANGE UP (ref 0.5–1.3)
GLUCOSE SERPL-MCNC: 97 MG/DL — SIGNIFICANT CHANGE UP (ref 70–99)
HCT VFR BLD CALC: 37.8 % — LOW (ref 39–50)
HGB BLD-MCNC: 12.9 G/DL — LOW (ref 13–17)
MCHC RBC-ENTMCNC: 29.9 PG — SIGNIFICANT CHANGE UP (ref 27–34)
MCHC RBC-ENTMCNC: 34.1 GM/DL — SIGNIFICANT CHANGE UP (ref 32–36)
MCV RBC AUTO: 87.7 FL — SIGNIFICANT CHANGE UP (ref 80–100)
PLATELET # BLD AUTO: 155 K/UL — SIGNIFICANT CHANGE UP (ref 150–400)
POTASSIUM SERPL-MCNC: 3.7 MMOL/L — SIGNIFICANT CHANGE UP (ref 3.5–5.3)
POTASSIUM SERPL-SCNC: 3.7 MMOL/L — SIGNIFICANT CHANGE UP (ref 3.5–5.3)
RBC # BLD: 4.31 M/UL — SIGNIFICANT CHANGE UP (ref 4.2–5.8)
RBC # FLD: 13.2 % — SIGNIFICANT CHANGE UP (ref 10.3–14.5)
SODIUM SERPL-SCNC: 140 MMOL/L — SIGNIFICANT CHANGE UP (ref 135–145)
WBC # BLD: 7.24 K/UL — SIGNIFICANT CHANGE UP (ref 3.8–10.5)
WBC # FLD AUTO: 7.24 K/UL — SIGNIFICANT CHANGE UP (ref 3.8–10.5)

## 2020-02-14 PROCEDURE — 93010 ELECTROCARDIOGRAM REPORT: CPT

## 2020-02-14 PROCEDURE — 74018 RADEX ABDOMEN 1 VIEW: CPT | Mod: 26

## 2020-02-14 RX ORDER — ACETAMINOPHEN 500 MG
1000 TABLET ORAL EVERY 6 HOURS
Refills: 0 | Status: COMPLETED | OUTPATIENT
Start: 2020-02-14 | End: 2020-02-15

## 2020-02-14 RX ORDER — DEXTROSE MONOHYDRATE, SODIUM CHLORIDE, AND POTASSIUM CHLORIDE 50; .745; 4.5 G/1000ML; G/1000ML; G/1000ML
1000 INJECTION, SOLUTION INTRAVENOUS
Refills: 0 | Status: DISCONTINUED | OUTPATIENT
Start: 2020-02-14 | End: 2020-02-17

## 2020-02-14 RX ORDER — HYDROMORPHONE HYDROCHLORIDE 2 MG/ML
1 INJECTION INTRAMUSCULAR; INTRAVENOUS; SUBCUTANEOUS ONCE
Refills: 0 | Status: DISCONTINUED | OUTPATIENT
Start: 2020-02-14 | End: 2020-02-14

## 2020-02-14 RX ADMIN — Medication 400 MILLIGRAM(S): at 11:47

## 2020-02-14 RX ADMIN — SODIUM CHLORIDE 150 MILLILITER(S): 9 INJECTION INTRAMUSCULAR; INTRAVENOUS; SUBCUTANEOUS at 17:14

## 2020-02-14 RX ADMIN — HYDROMORPHONE HYDROCHLORIDE 1 MILLIGRAM(S): 2 INJECTION INTRAMUSCULAR; INTRAVENOUS; SUBCUTANEOUS at 09:05

## 2020-02-14 RX ADMIN — Medication 15 MILLIGRAM(S): at 15:45

## 2020-02-14 RX ADMIN — HYDROMORPHONE HYDROCHLORIDE 1 MILLIGRAM(S): 2 INJECTION INTRAMUSCULAR; INTRAVENOUS; SUBCUTANEOUS at 21:25

## 2020-02-14 RX ADMIN — HYDROMORPHONE HYDROCHLORIDE 1 MILLIGRAM(S): 2 INJECTION INTRAMUSCULAR; INTRAVENOUS; SUBCUTANEOUS at 02:06

## 2020-02-14 RX ADMIN — Medication 15 MILLIGRAM(S): at 06:26

## 2020-02-14 RX ADMIN — HYDROMORPHONE HYDROCHLORIDE 1 MILLIGRAM(S): 2 INJECTION INTRAMUSCULAR; INTRAVENOUS; SUBCUTANEOUS at 08:50

## 2020-02-14 RX ADMIN — HYDROMORPHONE HYDROCHLORIDE 1 MILLIGRAM(S): 2 INJECTION INTRAMUSCULAR; INTRAVENOUS; SUBCUTANEOUS at 02:21

## 2020-02-14 RX ADMIN — SODIUM CHLORIDE 150 MILLILITER(S): 9 INJECTION INTRAMUSCULAR; INTRAVENOUS; SUBCUTANEOUS at 23:45

## 2020-02-14 RX ADMIN — Medication 1000 MILLIGRAM(S): at 12:00

## 2020-02-14 RX ADMIN — HYDROMORPHONE HYDROCHLORIDE 1 MILLIGRAM(S): 2 INJECTION INTRAMUSCULAR; INTRAVENOUS; SUBCUTANEOUS at 13:15

## 2020-02-14 RX ADMIN — HYDROMORPHONE HYDROCHLORIDE 1 MILLIGRAM(S): 2 INJECTION INTRAMUSCULAR; INTRAVENOUS; SUBCUTANEOUS at 13:01

## 2020-02-14 RX ADMIN — SODIUM CHLORIDE 150 MILLILITER(S): 9 INJECTION INTRAMUSCULAR; INTRAVENOUS; SUBCUTANEOUS at 10:35

## 2020-02-14 RX ADMIN — SODIUM CHLORIDE 150 MILLILITER(S): 9 INJECTION INTRAMUSCULAR; INTRAVENOUS; SUBCUTANEOUS at 04:54

## 2020-02-14 RX ADMIN — HYDROMORPHONE HYDROCHLORIDE 1 MILLIGRAM(S): 2 INJECTION INTRAMUSCULAR; INTRAVENOUS; SUBCUTANEOUS at 21:40

## 2020-02-14 RX ADMIN — HYDROMORPHONE HYDROCHLORIDE 1 MILLIGRAM(S): 2 INJECTION INTRAMUSCULAR; INTRAVENOUS; SUBCUTANEOUS at 17:30

## 2020-02-14 RX ADMIN — HYDROMORPHONE HYDROCHLORIDE 1 MILLIGRAM(S): 2 INJECTION INTRAMUSCULAR; INTRAVENOUS; SUBCUTANEOUS at 04:43

## 2020-02-14 RX ADMIN — HYDROMORPHONE HYDROCHLORIDE 1 MILLIGRAM(S): 2 INJECTION INTRAMUSCULAR; INTRAVENOUS; SUBCUTANEOUS at 04:58

## 2020-02-14 RX ADMIN — Medication 15 MILLIGRAM(S): at 06:41

## 2020-02-14 RX ADMIN — Medication 400 MILLIGRAM(S): at 23:45

## 2020-02-14 RX ADMIN — Medication 15 MILLIGRAM(S): at 16:01

## 2020-02-14 RX ADMIN — Medication 1000 MILLIGRAM(S): at 19:00

## 2020-02-14 RX ADMIN — HYDROMORPHONE HYDROCHLORIDE 1 MILLIGRAM(S): 2 INJECTION INTRAMUSCULAR; INTRAVENOUS; SUBCUTANEOUS at 17:14

## 2020-02-14 RX ADMIN — Medication 400 MILLIGRAM(S): at 18:28

## 2020-02-14 RX ADMIN — ERTAPENEM SODIUM 120 MILLIGRAM(S): 1 INJECTION, POWDER, LYOPHILIZED, FOR SOLUTION INTRAMUSCULAR; INTRAVENOUS at 01:57

## 2020-02-14 RX ADMIN — ENOXAPARIN SODIUM 40 MILLIGRAM(S): 100 INJECTION SUBCUTANEOUS at 11:47

## 2020-02-14 NOTE — CDI QUERY NOTE - NSCDIOTHERTXTBX_GEN_ALL_CORE_HH
Clinical documentation indicates that this patient has perforated diverticulitis with peritonitis.   IF clinically significant, Please clarify status of sepsis in the medical record.  Status:  Sepsis ruled in  Sepsis is resolving  Sepsis ruled out  Early Sepsis POA, resolved?    PRESENT ON ADMISSION:  Was sepsis present on admission?  If so, please document.    SUPPORTING DOCUMENTATION AND/OR CLINICAL EVIDENCE:  Vital Signs on Admission:  T(F): 101.8, HR: 129, BP: 104/70 , RR: 28  WBC/Bands: 12  Antibiotics:   ertapenem  IVPB  piperacillin/tazobactam IVPB.    lactated [id number] mL/Hr IV Continuous (02-12-20 @ 12:09)  sodium chloride 0.9% Bolus   1000 mL/Hr IV Bolus X2

## 2020-02-14 NOTE — PROGRESS NOTE ADULT - SUBJECTIVE AND OBJECTIVE BOX
Patient seen and examined at bedside on am rounds with the surgery team. post op from [facility]  afebrile  550cc uop,  pain tolerated    ICU Vital Signs Last 24 Hrs  T(C): 37.2 (14 Feb 2020 05:00), Max: 37.4 (13 Feb 2020 17:00)  T(F): 99 (14 Feb 2020 05:00), Max: 99.3 (13 Feb 2020 17:00)  HR: 115 (14 Feb 2020 07:00) (102 - 129)  BP: 130/69 (14 Feb 2020 07:00) (104/70 - 159/75)  BP(mean): 83 (14 Feb 2020 07:00) (81 - 86)  ABP: --  ABP(mean): --  RR: 18 (14 Feb 2020 07:00) (14 - 28)  SpO2: 100% (14 Feb 2020 07:00) (94% - 100%)      NAD, AOx3  S1 s2  CTAB  soft, tender, Hartmanns ostomy patent and viable,                                   14.0   12.50 )-----------( 189      ( 13 Feb 2020 09:16 )             41.8   02-13    139  |  108  |  16  ----------------------------<  100<H>  3.7   |  26  |  1.02    Ca    8.5      13 Feb 2020 09:16  Phos  2.3     02-13  Mg     1.7     02-13    TPro  6.6  /  Alb  2.9<L>  /  TBili  1.2  /  DBili  x   /  AST  28  /  ALT  27  /  AlkPhos  51  02-13

## 2020-02-14 NOTE — ADVANCED PRACTICE NURSE CONSULT - ASSESSMENT
This is a 44 year old male that was admitted to the hospital on 2/12/2020 for abdominal pain. No PMH.     In to initiate ostomy education with patient. Patient Alert and Oriented x4. Reports mild pain that has been well controlled with pain medications.  Patient with a Sherice's procedure on 2/13/2020 from perforated diverticulitis.     Wife present at the bedside. Reviewed the function and creation. Discussed diet, supplies and bathing. Reviewed lifting restrictions as well.  Both wife and patient receptive. All questions answered.     Stoma located on the LLQ and is pink, moist, viable and nicely protruded. No stool or flatus noted. Wafer and pouch intact. Charline incisional managements dressing in place. Abdomen distended and tender. NGT remains in place with bilious drainage noted.     Ostomy education folder reviewed as well.    Patient remains resting in the bed.

## 2020-02-14 NOTE — PROGRESS NOTE ADULT - ASSESSMENT
44 post op day 1 hartmans     -dc ngt   -dc bangura  - Continue IVF, IV abx (Zosyn),  -NPO  - Serial abdominal exams  -GI/DVT ppx    will discuss with attending, Dr. Neri.

## 2020-02-14 NOTE — PROGRESS NOTE ADULT - SUBJECTIVE AND OBJECTIVE BOX
Date of service: 02-14-20 @ 11:09    Events noted  Underwent surgery yesterday with colectomy and colostomy  Found with purulence in peritoneum  Has low grade fever    ROS: denies dizziness, no HA, no SOB or cough, no dysuria, no legs pain, no rashes    MEDICATIONS  (STANDING):  acetaminophen  IVPB .. 1000 milliGRAM(s) IV Intermittent every 6 hours  enoxaparin Injectable 40 milliGRAM(s) SubCutaneous daily  ertapenem  IVPB 1000 milliGRAM(s) IV Intermittent every 24 hours  sodium chloride 0.9%. 1000 milliLiter(s) (150 mL/Hr) IV Continuous <Continuous>  tamsulosin 0.4 milliGRAM(s) Oral at bedtime      Vital Signs Last 24 Hrs  T(C): 37.4 (14 Feb 2020 09:03), Max: 37.4 (13 Feb 2020 17:00)  T(F): 99.4 (14 Feb 2020 09:03), Max: 99.4 (14 Feb 2020 09:03)  HR: 115 (14 Feb 2020 10:00) (102 - 129)  BP: 138/72 (14 Feb 2020 10:00) (104/70 - 159/75)  BP(mean): 88 (14 Feb 2020 10:00) (81 - 126)  RR: 12 (14 Feb 2020 09:00) (12 - 28)  SpO2: 98% (14 Feb 2020 10:00) (94% - 100%)    Physical Exam:      Constitutional:  No acute distress  HEENT: NC/AT, EOMI, PERRLA, conjunctivae clear  Neck: supple; thyroid not palpable  Back: no tenderness  Respiratory: respiratory effort normal; clear to auscultation  Cardiovascular: S1S2 regular, no murmurs  Abdomen: soft, lower abdomen tender, not distended, positive BS  Genitourinary: no suprapubic tenderness  Lymphatic: no LN palpable  Musculoskeletal: no muscle tenderness, no joint swelling or tenderness  Extremities: no pedal edema  Neurological/ Psychiatric: AxOx3, moving all extremities  Skin: no rashes; no palpable lesions    Labs: reviewed                        12.9   7.24  )-----------( 155      ( 14 Feb 2020 09:03 )             37.8     02-14    140  |  111<H>  |  12  ----------------------------<  97  3.7   |  25  |  0.80    Ca    7.7<L>      14 Feb 2020 09:03  Phos  2.3     02-13  Mg     1.7     02-13    TPro  6.6  /  Alb  2.9<L>  /  TBili  1.2  /  DBili  x   /  AST  28  /  ALT  27  /  AlkPhos  51  02-13                        15.3   8.39  )-----------( 223      ( 12 Feb 2020 09:59 )             45.5     02-12    140  |  108  |  15  ----------------------------<  89  4.0   |  27  |  0.92    Ca    9.2      12 Feb 2020 09:59    TPro  7.4  /  Alb  3.9  /  TBili  0.4  /  DBili  x   /  AST  26  /  ALT  26  /  AlkPhos  61  02-12     LIVER FUNCTIONS - ( 12 Feb 2020 09:59 )  Alb: 3.9 g/dL / Pro: 7.4 gm/dL / ALK PHOS: 61 U/L / ALT: 26 U/L / AST: 26 U/L / GGT: x       Culture - Fungal, Other (collected 13 Feb 2020 12:43)  Source: .Other #2 INTRA ABDOMINAL ABSCESS  Preliminary Report (14 Feb 2020 07:54):    Testing in progress    Culture - Fungal, Other (collected 13 Feb 2020 12:43)  Source: .Other #1 INTRA ABDOMINAL ABSCESS  Preliminary Report (14 Feb 2020 07:54):    Testing in progress            Radiology: all available radiological tests reviewed    < from: CT Abdomen and Pelvis w/ IV Cont (02.12.20 @ 10:17) >  Diverticulitis involving the mid sigmoid colon with an interval improvement in inflammatory changes adjacent to the mid sigmoid colon since 1/25/2020.  Droplets of free air adjacent to the sigmoid colon and additional droplets of free air in the upper abdomen; free air previously not seen in the upper abdomen on 1/25/2020.  New small amount of ascites.    < end of copied text >      Advanced directives addressed: full resuscitation

## 2020-02-14 NOTE — PROVIDER CONTACT NOTE (OTHER) - SITUATION
Spoke with office to inform dr that patient is in [facility].  Please fax discharge papers to [phone].

## 2020-02-15 LAB
ANION GAP SERPL CALC-SCNC: 5 MMOL/L — SIGNIFICANT CHANGE UP (ref 5–17)
BUN SERPL-MCNC: 9 MG/DL — SIGNIFICANT CHANGE UP (ref 7–23)
CALCIUM SERPL-MCNC: 8.1 MG/DL — LOW (ref 8.5–10.1)
CHLORIDE SERPL-SCNC: 109 MMOL/L — HIGH (ref 96–108)
CO2 SERPL-SCNC: 26 MMOL/L — SIGNIFICANT CHANGE UP (ref 22–31)
CREAT SERPL-MCNC: 0.7 MG/DL — SIGNIFICANT CHANGE UP (ref 0.5–1.3)
GLUCOSE SERPL-MCNC: 94 MG/DL — SIGNIFICANT CHANGE UP (ref 70–99)
HCT VFR BLD CALC: 34.8 % — LOW (ref 39–50)
HGB BLD-MCNC: 11.5 G/DL — LOW (ref 13–17)
MAGNESIUM SERPL-MCNC: 1.9 MG/DL — SIGNIFICANT CHANGE UP (ref 1.6–2.6)
MCHC RBC-ENTMCNC: 29 PG — SIGNIFICANT CHANGE UP (ref 27–34)
MCHC RBC-ENTMCNC: 33 GM/DL — SIGNIFICANT CHANGE UP (ref 32–36)
MCV RBC AUTO: 87.7 FL — SIGNIFICANT CHANGE UP (ref 80–100)
PHOSPHATE SERPL-MCNC: 1.5 MG/DL — LOW (ref 2.5–4.5)
PLATELET # BLD AUTO: 160 K/UL — SIGNIFICANT CHANGE UP (ref 150–400)
POTASSIUM SERPL-MCNC: 3.5 MMOL/L — SIGNIFICANT CHANGE UP (ref 3.5–5.3)
POTASSIUM SERPL-SCNC: 3.5 MMOL/L — SIGNIFICANT CHANGE UP (ref 3.5–5.3)
RBC # BLD: 3.97 M/UL — LOW (ref 4.2–5.8)
RBC # FLD: 12.9 % — SIGNIFICANT CHANGE UP (ref 10.3–14.5)
SODIUM SERPL-SCNC: 140 MMOL/L — SIGNIFICANT CHANGE UP (ref 135–145)
WBC # BLD: 6.86 K/UL — SIGNIFICANT CHANGE UP (ref 3.8–10.5)
WBC # FLD AUTO: 6.86 K/UL — SIGNIFICANT CHANGE UP (ref 3.8–10.5)

## 2020-02-15 RX ORDER — POTASSIUM PHOSPHATE, MONOBASIC POTASSIUM PHOSPHATE, DIBASIC 236; 224 MG/ML; MG/ML
15 INJECTION, SOLUTION INTRAVENOUS ONCE
Refills: 0 | Status: COMPLETED | OUTPATIENT
Start: 2020-02-15 | End: 2020-02-15

## 2020-02-15 RX ORDER — SODIUM CHLORIDE 9 MG/ML
1000 INJECTION, SOLUTION INTRAVENOUS ONCE
Refills: 0 | Status: COMPLETED | OUTPATIENT
Start: 2020-02-15 | End: 2020-02-15

## 2020-02-15 RX ADMIN — Medication 15 MILLIGRAM(S): at 22:00

## 2020-02-15 RX ADMIN — Medication 400 MILLIGRAM(S): at 05:59

## 2020-02-15 RX ADMIN — SODIUM CHLORIDE 1000 MILLILITER(S): 9 INJECTION, SOLUTION INTRAVENOUS at 17:49

## 2020-02-15 RX ADMIN — HYDROMORPHONE HYDROCHLORIDE 1 MILLIGRAM(S): 2 INJECTION INTRAMUSCULAR; INTRAVENOUS; SUBCUTANEOUS at 23:15

## 2020-02-15 RX ADMIN — ENOXAPARIN SODIUM 40 MILLIGRAM(S): 100 INJECTION SUBCUTANEOUS at 12:45

## 2020-02-15 RX ADMIN — TAMSULOSIN HYDROCHLORIDE 0.4 MILLIGRAM(S): 0.4 CAPSULE ORAL at 21:43

## 2020-02-15 RX ADMIN — HYDROMORPHONE HYDROCHLORIDE 1 MILLIGRAM(S): 2 INJECTION INTRAMUSCULAR; INTRAVENOUS; SUBCUTANEOUS at 18:47

## 2020-02-15 RX ADMIN — DEXTROSE MONOHYDRATE, SODIUM CHLORIDE, AND POTASSIUM CHLORIDE 120 MILLILITER(S): 50; .745; 4.5 INJECTION, SOLUTION INTRAVENOUS at 00:04

## 2020-02-15 RX ADMIN — HYDROMORPHONE HYDROCHLORIDE 1 MILLIGRAM(S): 2 INJECTION INTRAMUSCULAR; INTRAVENOUS; SUBCUTANEOUS at 09:10

## 2020-02-15 RX ADMIN — HYDROMORPHONE HYDROCHLORIDE 1 MILLIGRAM(S): 2 INJECTION INTRAMUSCULAR; INTRAVENOUS; SUBCUTANEOUS at 15:05

## 2020-02-15 RX ADMIN — ERTAPENEM SODIUM 120 MILLIGRAM(S): 1 INJECTION, POWDER, LYOPHILIZED, FOR SOLUTION INTRAMUSCULAR; INTRAVENOUS at 02:19

## 2020-02-15 RX ADMIN — Medication 1000 MILLIGRAM(S): at 06:15

## 2020-02-15 RX ADMIN — HYDROMORPHONE HYDROCHLORIDE 1 MILLIGRAM(S): 2 INJECTION INTRAMUSCULAR; INTRAVENOUS; SUBCUTANEOUS at 19:05

## 2020-02-15 RX ADMIN — HYDROMORPHONE HYDROCHLORIDE 1 MILLIGRAM(S): 2 INJECTION INTRAMUSCULAR; INTRAVENOUS; SUBCUTANEOUS at 02:44

## 2020-02-15 RX ADMIN — Medication 15 MILLIGRAM(S): at 09:10

## 2020-02-15 RX ADMIN — HYDROMORPHONE HYDROCHLORIDE 1 MILLIGRAM(S): 2 INJECTION INTRAMUSCULAR; INTRAVENOUS; SUBCUTANEOUS at 14:29

## 2020-02-15 RX ADMIN — HYDROMORPHONE HYDROCHLORIDE 1 MILLIGRAM(S): 2 INJECTION INTRAMUSCULAR; INTRAVENOUS; SUBCUTANEOUS at 22:49

## 2020-02-15 RX ADMIN — POTASSIUM PHOSPHATE, MONOBASIC POTASSIUM PHOSPHATE, DIBASIC 63.75 MILLIMOLE(S): 236; 224 INJECTION, SOLUTION INTRAVENOUS at 15:02

## 2020-02-15 RX ADMIN — Medication 1000 MILLIGRAM(S): at 00:00

## 2020-02-15 RX ADMIN — HYDROMORPHONE HYDROCHLORIDE 1 MILLIGRAM(S): 2 INJECTION INTRAMUSCULAR; INTRAVENOUS; SUBCUTANEOUS at 09:25

## 2020-02-15 RX ADMIN — Medication 15 MILLIGRAM(S): at 21:43

## 2020-02-15 RX ADMIN — DEXTROSE MONOHYDRATE, SODIUM CHLORIDE, AND POTASSIUM CHLORIDE 120 MILLILITER(S): 50; .745; 4.5 INJECTION, SOLUTION INTRAVENOUS at 09:05

## 2020-02-15 RX ADMIN — Medication 650 MILLIGRAM(S): at 13:04

## 2020-02-15 RX ADMIN — HYDROMORPHONE HYDROCHLORIDE 1 MILLIGRAM(S): 2 INJECTION INTRAMUSCULAR; INTRAVENOUS; SUBCUTANEOUS at 02:19

## 2020-02-15 RX ADMIN — Medication 15 MILLIGRAM(S): at 08:45

## 2020-02-15 RX ADMIN — DEXTROSE MONOHYDRATE, SODIUM CHLORIDE, AND POTASSIUM CHLORIDE 120 MILLILITER(S): 50; .745; 4.5 INJECTION, SOLUTION INTRAVENOUS at 18:47

## 2020-02-15 NOTE — PROGRESS NOTE ADULT - ASSESSMENT
44 post op day 2 hartmans     -dc ngt   -dc bangura  - Continue IVF, IV abx (Zosyn),  -CLD  - Serial abdominal exams  -GI/DVT ppx    d/w Dr Moraes

## 2020-02-15 NOTE — PROGRESS NOTE ADULT - SUBJECTIVE AND OBJECTIVE BOX
Patient seen and examined at bedside on am rounds with the surgery team. post op from [facility]  afebrile    ICU Vital Signs Last 24 Hrs  T(C): 37.2 (14 Feb 2020 05:00), Max: 37.4 (13 Feb 2020 17:00)  T(F): 99 (14 Feb 2020 05:00), Max: 99.3 (13 Feb 2020 17:00)  HR: 115 (14 Feb 2020 07:00) (102 - 129)  BP: 130/69 (14 Feb 2020 07:00) (104/70 - 159/75)  BP(mean): 83 (14 Feb 2020 07:00) (81 - 86)  ABP: --  ABP(mean): --  RR: 18 (14 Feb 2020 07:00) (14 - 28)  SpO2: 100% (14 Feb 2020 07:00) (94% - 100%)      NAD, AOx3  S1 s2  CTAB  soft, tender, Hartmanns ostomy patent and viable,                                   14.0   12.50 )-----------( 189      ( 13 Feb 2020 09:16 )             41.8   02-13    139  |  108  |  16  ----------------------------<  100<H>  3.7   |  26  |  1.02    Ca    8.5      13 Feb 2020 09:16  Phos  2.3     02-13  Mg     1.7     02-13    TPro  6.6  /  Alb  2.9<L>  /  TBili  1.2  /  DBili  x   /  AST  28  /  ALT  27  /  AlkPhos  51  02-13

## 2020-02-16 LAB
ANION GAP SERPL CALC-SCNC: 4 MMOL/L — LOW (ref 5–17)
BUN SERPL-MCNC: 9 MG/DL — SIGNIFICANT CHANGE UP (ref 7–23)
CALCIUM SERPL-MCNC: 8.3 MG/DL — LOW (ref 8.5–10.1)
CHLORIDE SERPL-SCNC: 109 MMOL/L — HIGH (ref 96–108)
CO2 SERPL-SCNC: 29 MMOL/L — SIGNIFICANT CHANGE UP (ref 22–31)
CREAT SERPL-MCNC: 0.68 MG/DL — SIGNIFICANT CHANGE UP (ref 0.5–1.3)
GLUCOSE SERPL-MCNC: 112 MG/DL — HIGH (ref 70–99)
HCT VFR BLD CALC: 35.4 % — LOW (ref 39–50)
HGB BLD-MCNC: 11.8 G/DL — LOW (ref 13–17)
MAGNESIUM SERPL-MCNC: 2.1 MG/DL — SIGNIFICANT CHANGE UP (ref 1.6–2.6)
MCHC RBC-ENTMCNC: 29 PG — SIGNIFICANT CHANGE UP (ref 27–34)
MCHC RBC-ENTMCNC: 33.3 GM/DL — SIGNIFICANT CHANGE UP (ref 32–36)
MCV RBC AUTO: 87 FL — SIGNIFICANT CHANGE UP (ref 80–100)
PHOSPHATE SERPL-MCNC: 2.6 MG/DL — SIGNIFICANT CHANGE UP (ref 2.5–4.5)
PLATELET # BLD AUTO: 199 K/UL — SIGNIFICANT CHANGE UP (ref 150–400)
POTASSIUM SERPL-MCNC: 3.6 MMOL/L — SIGNIFICANT CHANGE UP (ref 3.5–5.3)
POTASSIUM SERPL-SCNC: 3.6 MMOL/L — SIGNIFICANT CHANGE UP (ref 3.5–5.3)
RBC # BLD: 4.07 M/UL — LOW (ref 4.2–5.8)
RBC # FLD: 12.7 % — SIGNIFICANT CHANGE UP (ref 10.3–14.5)
SODIUM SERPL-SCNC: 142 MMOL/L — SIGNIFICANT CHANGE UP (ref 135–145)
WBC # BLD: 5.67 K/UL — SIGNIFICANT CHANGE UP (ref 3.8–10.5)
WBC # FLD AUTO: 5.67 K/UL — SIGNIFICANT CHANGE UP (ref 3.8–10.5)

## 2020-02-16 RX ADMIN — Medication 650 MILLIGRAM(S): at 03:45

## 2020-02-16 RX ADMIN — HYDROMORPHONE HYDROCHLORIDE 1 MILLIGRAM(S): 2 INJECTION INTRAMUSCULAR; INTRAVENOUS; SUBCUTANEOUS at 12:59

## 2020-02-16 RX ADMIN — DEXTROSE MONOHYDRATE, SODIUM CHLORIDE, AND POTASSIUM CHLORIDE 120 MILLILITER(S): 50; .745; 4.5 INJECTION, SOLUTION INTRAVENOUS at 22:16

## 2020-02-16 RX ADMIN — HYDROMORPHONE HYDROCHLORIDE 1 MILLIGRAM(S): 2 INJECTION INTRAMUSCULAR; INTRAVENOUS; SUBCUTANEOUS at 08:45

## 2020-02-16 RX ADMIN — Medication 15 MILLIGRAM(S): at 23:00

## 2020-02-16 RX ADMIN — Medication 15 MILLIGRAM(S): at 15:45

## 2020-02-16 RX ADMIN — HYDROMORPHONE HYDROCHLORIDE 1 MILLIGRAM(S): 2 INJECTION INTRAMUSCULAR; INTRAVENOUS; SUBCUTANEOUS at 04:05

## 2020-02-16 RX ADMIN — HYDROMORPHONE HYDROCHLORIDE 1 MILLIGRAM(S): 2 INJECTION INTRAMUSCULAR; INTRAVENOUS; SUBCUTANEOUS at 17:35

## 2020-02-16 RX ADMIN — Medication 15 MILLIGRAM(S): at 07:00

## 2020-02-16 RX ADMIN — TAMSULOSIN HYDROCHLORIDE 0.4 MILLIGRAM(S): 0.4 CAPSULE ORAL at 21:11

## 2020-02-16 RX ADMIN — Medication 15 MILLIGRAM(S): at 22:44

## 2020-02-16 RX ADMIN — HYDROMORPHONE HYDROCHLORIDE 1 MILLIGRAM(S): 2 INJECTION INTRAMUSCULAR; INTRAVENOUS; SUBCUTANEOUS at 08:14

## 2020-02-16 RX ADMIN — Medication 650 MILLIGRAM(S): at 03:10

## 2020-02-16 RX ADMIN — ENOXAPARIN SODIUM 40 MILLIGRAM(S): 100 INJECTION SUBCUTANEOUS at 13:00

## 2020-02-16 RX ADMIN — ERTAPENEM SODIUM 120 MILLIGRAM(S): 1 INJECTION, POWDER, LYOPHILIZED, FOR SOLUTION INTRAMUSCULAR; INTRAVENOUS at 02:07

## 2020-02-16 RX ADMIN — HYDROMORPHONE HYDROCHLORIDE 1 MILLIGRAM(S): 2 INJECTION INTRAMUSCULAR; INTRAVENOUS; SUBCUTANEOUS at 17:17

## 2020-02-16 RX ADMIN — Medication 15 MILLIGRAM(S): at 06:41

## 2020-02-16 RX ADMIN — DEXTROSE MONOHYDRATE, SODIUM CHLORIDE, AND POTASSIUM CHLORIDE 120 MILLILITER(S): 50; .745; 4.5 INJECTION, SOLUTION INTRAVENOUS at 03:14

## 2020-02-16 RX ADMIN — Medication 15 MILLIGRAM(S): at 14:44

## 2020-02-16 RX ADMIN — HYDROMORPHONE HYDROCHLORIDE 1 MILLIGRAM(S): 2 INJECTION INTRAMUSCULAR; INTRAVENOUS; SUBCUTANEOUS at 03:48

## 2020-02-16 RX ADMIN — HYDROMORPHONE HYDROCHLORIDE 1 MILLIGRAM(S): 2 INJECTION INTRAMUSCULAR; INTRAVENOUS; SUBCUTANEOUS at 13:30

## 2020-02-16 NOTE — PROGRESS NOTE ADULT - ASSESSMENT
44 post op day 3hartmans     -Continue on Clears until function from ostomy  - Continue IVF, IV abx  - OOB/AMB  - Serial abdominal exams  - GI/DVT ppx    d/w Dr Moraes

## 2020-02-16 NOTE — PROGRESS NOTE ADULT - SUBJECTIVE AND OBJECTIVE BOX
Patient seen and examined at bedside on am rounds with the surgery team. post op from [facility]  ICU Vital Signs Last 24 Hrs  T(C): 37.2 (16 Feb 2020 05:00), Max: 38.8 (15 Feb 2020 13:29)  T(F): 98.9 (16 Feb 2020 05:00), Max: 101.8 (15 Feb 2020 13:29)  HR: 92 (16 Feb 2020 09:00) (88 - 109)  BP: 128/81 (16 Feb 2020 09:00) (113/67 - 140/76)  BP(mean): 91 (16 Feb 2020 09:00) (77 - 94)  ABP: --  ABP(mean): --  RR: 24 (16 Feb 2020 09:00) (11 - 26)  SpO2: 98% (16 Feb 2020 09:00) (90% - 98%)  epsiode of fevers overnight bolused and responded appropriately            NAD, AOx3  S1 s2  CTAB  soft, tender, HartBanners ostomy patent and viable,                            11.8   5.67  )-----------( 199      ( 16 Feb 2020 07:05 )             35.4   02-16    142  |  109<H>  |  9   ----------------------------<  112<H>  3.6   |  29  |  0.68    Ca    8.3<L>      16 Feb 2020 07:05  Phos  2.6     02-16  Mg     2.1     02-16

## 2020-02-16 NOTE — PROGRESS NOTE ADULT - SUBJECTIVE AND OBJECTIVE BOX
Date of service: 02-16-20 @ 11:44      Patient sitting in chair; was ambulating earlier  afebrile today      ROS: no fever or chills; denies dizziness, no HA, no SOB or cough,  no diarrhea or constipation; no dysuria, no urinary frequency, no legs pain, no rashes    MEDICATIONS  (STANDING):  dextrose 5% + sodium chloride 0.45% with potassium chloride 20 mEq/L 1000 milliLiter(s) (120 mL/Hr) IV Continuous <Continuous>  enoxaparin Injectable 40 milliGRAM(s) SubCutaneous daily  ertapenem  IVPB 1000 milliGRAM(s) IV Intermittent every 24 hours  tamsulosin 0.4 milliGRAM(s) Oral at bedtime    MEDICATIONS  (PRN):  acetaminophen   Tablet .. 650 milliGRAM(s) Oral every 6 hours PRN Temp greater or equal to 38C (100.4F), Mild Pain (1 - 3)  HYDROmorphone  Injectable 1 milliGRAM(s) IV Push every 4 hours PRN Severe Pain (7 - 10)  ketorolac   Injectable 15 milliGRAM(s) IV Push every 8 hours PRN Moderate Pain (4 - 6)  ondansetron Injectable 4 milliGRAM(s) IV Push every 6 hours PRN Nausea and/or Vomiting      Vital Signs Last 24 Hrs  T(C): 36.9 (16 Feb 2020 08:05), Max: 38.8 (15 Feb 2020 13:29)  T(F): 98.5 (16 Feb 2020 08:05), Max: 101.8 (15 Feb 2020 13:29)  HR: 102 (16 Feb 2020 11:00) (88 - 109)  BP: 128/81 (16 Feb 2020 09:00) (113/67 - 140/76)  BP(mean): 91 (16 Feb 2020 09:00) (77 - 94)  RR: 14 (16 Feb 2020 11:00) (14 - 26)  SpO2: 96% (16 Feb 2020 11:00) (91% - 98%)    Physical Exam:      Constitutional:  No acute distress  HEENT: NC/AT, EOMI, PERRLA, conjunctivae clear  Neck: supple; thyroid not palpable  Back: no tenderness  Respiratory: respiratory effort normal; clear to auscultation  Cardiovascular: S1S2 regular, no murmurs  Abdomen: soft, ostomy in place  Genitourinary: no suprapubic tenderness  Musculoskeletal: no muscle tenderness, no joint swelling or tenderness  Extremities: no pedal edema  Neurological/ Psychiatric: AxOx3, moving all extremities  Skin: no rashes; no palpable lesions    Labs: reviewed                       Labs:                        11.8   5.67  )-----------( 199      ( 16 Feb 2020 07:05 )             35.4     02-16    142  |  109<H>  |  9   ----------------------------<  112<H>  3.6   |  29  |  0.68    Ca    8.3<L>      16 Feb 2020 07:05  Phos  2.6     02-16  Mg     2.1     02-16             Cultures:       Culture - Fungal, Other (collected 02-13-20 @ 12:43)  Source: .Other #2 INTRA ABDOMINAL ABSCESS  Preliminary Report (02-14-20 @ 07:54):    Testing in progress    Culture - Surgical Swab (collected 02-13-20 @ 12:43)  Source: .Surgical Swab #2 INTRA ABDOMINAL ABSCESS  Preliminary Report (02-15-20 @ 20:25):    Rare Citrobacter freundii complex    Growth in fluid media only Clostridium tertium "Susceptibilities not    performed"  Organism: Citrobacter freundii complex (02-15-20 @ 19:16)  Organism: Citrobacter freundii complex (02-15-20 @ 19:16)      -  Amikacin: S <=16      -  Amoxicillin/Clavulanic Acid: R 16/8      -  Ampicillin: R <=8 These ampicillin results predict results for amoxicillin      -  Ampicillin/Sulbactam: R <=4/2 Enterobacter, Citrobacter, and Serratia may develop resistance during prolonged therapy (3-4 days)      -  Aztreonam: S <=4      -  Cefazolin: R >16 Enterobacter, Citrobacter, and Serratia may develop resistance during prolonged therapy (3-4 days)      -  Cefepime: S <=2      -  Cefoxitin: R >16      -  Ceftriaxone: S <=1 Enterobacter, Citrobacter, and Serratia may develop resistance during prolonged therapy      -  Ciprofloxacin: S <=1      -  Ertapenem: S <=0.5      -  Gentamicin: S <=2      -  Imipenem: S <=1      -  Levofloxacin: S <=2      -  Meropenem: S <=1      -  Piperacillin/Tazobactam: S <=8      -  Tobramycin: S <=2      -  Trimethoprim/Sulfamethoxazole: S <=2/38      Method Type: REBECCA    Culture - Fungal, Other (collected 02-13-20 @ 12:43)  Source: .Other #1 INTRA ABDOMINAL ABSCESS  Preliminary Report (02-14-20 @ 07:54):    Testing in progress    Culture - Surgical Swab (collected 02-13-20 @ 12:43)  Source: .Surgical Swab #1 INTRA ABDOMINAL ABSCESS  Preliminary Report (02-15-20 @ 20:22):    Rare Clostridium tertium "Susceptibilities not performed"        Radiology: all available radiological tests reviewed    < from: CT Abdomen and Pelvis w/ IV Cont (02.12.20 @ 10:17) >  Diverticulitis involving the mid sigmoid colon with an interval improvement in inflammatory changes adjacent to the mid sigmoid colon since 1/25/2020.  Droplets of free air adjacent to the sigmoid colon and additional droplets of free air in the upper abdomen; free air previously not seen in the upper abdomen on 1/25/2020.  New small amount of ascites.    < end of copied text >      Advanced directives addressed: full resuscitation

## 2020-02-16 NOTE — PROGRESS NOTE ADULT - ASSESSMENT
43 y/o Male with no significant PMH was admitted on 2/12 for lower abdominal pain x one day. The patient developed abdomina pain 3 weeks PTA and workup showed acute diverticulitis. He was treated wit hIV abx for 3 days followed by oral cipo and metronidazole for 2 more weeks. His symptoms resolved and he was well for another week after he completed his oral abx. His abdominal pain recurred the day PTA. Patient admits pain 7/10 in intensity and has progressively worsening. Patient denies nausea, vomiting, fevers, chills. In ER he received zosyn.     1. Low grade fever. Acute sigmoid, recurrent diverticulitis with intramural abscess s/p celectomy. Acute purulent peritonitis.  -abdominal pain is improving  -surgical evaluation appreciated  -monitor abdomen closely  -on ertapenem 1 gm IV qd # 5 which will cover polymicrobial infection including the organisms found in OR cultures  -tolerating abx well so far; no side effects noted  -local wound care  -continue abx coverage  -pain management  -monitor temps  -f/u CBC  -supportive care  - wound care and diet per surgery  2. Other issues:   -care per medicine

## 2020-02-17 LAB
ANION GAP SERPL CALC-SCNC: 4 MMOL/L — LOW (ref 5–17)
BUN SERPL-MCNC: 10 MG/DL — SIGNIFICANT CHANGE UP (ref 7–23)
CALCIUM SERPL-MCNC: 8.7 MG/DL — SIGNIFICANT CHANGE UP (ref 8.5–10.1)
CHLORIDE SERPL-SCNC: 111 MMOL/L — HIGH (ref 96–108)
CO2 SERPL-SCNC: 28 MMOL/L — SIGNIFICANT CHANGE UP (ref 22–31)
CREAT SERPL-MCNC: 0.71 MG/DL — SIGNIFICANT CHANGE UP (ref 0.5–1.3)
GLUCOSE SERPL-MCNC: 101 MG/DL — HIGH (ref 70–99)
HCT VFR BLD CALC: 36 % — LOW (ref 39–50)
HGB BLD-MCNC: 12.3 G/DL — LOW (ref 13–17)
MAGNESIUM SERPL-MCNC: 2 MG/DL — SIGNIFICANT CHANGE UP (ref 1.6–2.6)
MCHC RBC-ENTMCNC: 29.6 PG — SIGNIFICANT CHANGE UP (ref 27–34)
MCHC RBC-ENTMCNC: 34.2 GM/DL — SIGNIFICANT CHANGE UP (ref 32–36)
MCV RBC AUTO: 86.5 FL — SIGNIFICANT CHANGE UP (ref 80–100)
PHOSPHATE SERPL-MCNC: 3.2 MG/DL — SIGNIFICANT CHANGE UP (ref 2.5–4.5)
PLATELET # BLD AUTO: 220 K/UL — SIGNIFICANT CHANGE UP (ref 150–400)
POTASSIUM SERPL-MCNC: 4 MMOL/L — SIGNIFICANT CHANGE UP (ref 3.5–5.3)
POTASSIUM SERPL-SCNC: 4 MMOL/L — SIGNIFICANT CHANGE UP (ref 3.5–5.3)
RBC # BLD: 4.16 M/UL — LOW (ref 4.2–5.8)
RBC # FLD: 12.8 % — SIGNIFICANT CHANGE UP (ref 10.3–14.5)
SODIUM SERPL-SCNC: 143 MMOL/L — SIGNIFICANT CHANGE UP (ref 135–145)
WBC # BLD: 5.19 K/UL — SIGNIFICANT CHANGE UP (ref 3.8–10.5)
WBC # FLD AUTO: 5.19 K/UL — SIGNIFICANT CHANGE UP (ref 3.8–10.5)

## 2020-02-17 RX ORDER — ONDANSETRON 8 MG/1
4 TABLET, FILM COATED ORAL ONCE
Refills: 0 | Status: COMPLETED | OUTPATIENT
Start: 2020-02-17 | End: 2020-02-17

## 2020-02-17 RX ADMIN — DEXTROSE MONOHYDRATE, SODIUM CHLORIDE, AND POTASSIUM CHLORIDE 120 MILLILITER(S): 50; .745; 4.5 INJECTION, SOLUTION INTRAVENOUS at 08:08

## 2020-02-17 RX ADMIN — Medication 650 MILLIGRAM(S): at 08:39

## 2020-02-17 RX ADMIN — ERTAPENEM SODIUM 120 MILLIGRAM(S): 1 INJECTION, POWDER, LYOPHILIZED, FOR SOLUTION INTRAMUSCULAR; INTRAVENOUS at 01:57

## 2020-02-17 RX ADMIN — ENOXAPARIN SODIUM 40 MILLIGRAM(S): 100 INJECTION SUBCUTANEOUS at 09:25

## 2020-02-17 RX ADMIN — Medication 650 MILLIGRAM(S): at 14:29

## 2020-02-17 RX ADMIN — HYDROMORPHONE HYDROCHLORIDE 1 MILLIGRAM(S): 2 INJECTION INTRAMUSCULAR; INTRAVENOUS; SUBCUTANEOUS at 22:20

## 2020-02-17 RX ADMIN — Medication 15 MILLIGRAM(S): at 09:55

## 2020-02-17 RX ADMIN — HYDROMORPHONE HYDROCHLORIDE 1 MILLIGRAM(S): 2 INJECTION INTRAMUSCULAR; INTRAVENOUS; SUBCUTANEOUS at 04:14

## 2020-02-17 RX ADMIN — HYDROMORPHONE HYDROCHLORIDE 1 MILLIGRAM(S): 2 INJECTION INTRAMUSCULAR; INTRAVENOUS; SUBCUTANEOUS at 04:01

## 2020-02-17 RX ADMIN — ONDANSETRON 4 MILLIGRAM(S): 8 TABLET, FILM COATED ORAL at 04:03

## 2020-02-17 RX ADMIN — Medication 15 MILLIGRAM(S): at 09:25

## 2020-02-17 RX ADMIN — Medication 15 MILLIGRAM(S): at 17:22

## 2020-02-17 RX ADMIN — HYDROMORPHONE HYDROCHLORIDE 1 MILLIGRAM(S): 2 INJECTION INTRAMUSCULAR; INTRAVENOUS; SUBCUTANEOUS at 21:51

## 2020-02-17 RX ADMIN — Medication 650 MILLIGRAM(S): at 14:59

## 2020-02-17 RX ADMIN — TAMSULOSIN HYDROCHLORIDE 0.4 MILLIGRAM(S): 0.4 CAPSULE ORAL at 21:51

## 2020-02-17 RX ADMIN — Medication 650 MILLIGRAM(S): at 08:09

## 2020-02-17 NOTE — PROGRESS NOTE ADULT - ASSESSMENT
44  Y/M post op day 4 for [facility]'s     - Full  liquid diet   -  D/C IVF,   - Continue IV abx  - OOB/AMB  - Serial abdominal exams  - GI/DVT ppx    d/w Dr Plaza

## 2020-02-17 NOTE — PROGRESS NOTE ADULT - ASSESSMENT
45 y/o Male with no significant PMH was admitted on 2/12 for lower abdominal pain x one day. The patient developed abdomina pain 3 weeks PTA and workup showed acute diverticulitis. He was treated wit hIV abx for 3 days followed by oral cipo and metronidazole for 2 more weeks. His symptoms resolved and he was well for another week after he completed his oral abx. His abdominal pain recurred the day PTA. Patient admits pain 7/10 in intensity and has progressively worsening. Patient denies nausea, vomiting, fevers, chills. In ER he received zosyn.     1. Low grade fever. Acute sigmoid, recurrent diverticulitis with intramural abscess s/p celectomy. Acute purulent peritonitis.  -abdominal pain is improving  -surgical evaluation appreciated  -monitor abdomen closely  -on ertapenem 1 gm IV qd # 6 which will cover polymicrobial infection including the organisms found in OR cultures  - when ready for discharge expect to be able to discharge on ciprofloxacin 500 mg po q 12 hours and flagyl 500 mg po q 8 hours for 7 more days  -tolerating abx well so far; no side effects noted  -local wound care  -continue abx coverage  -pain management  -monitor temps  -f/u CBC  -supportive care  - wound care and diet per surgery  2. Other issues:   -care per medicine

## 2020-02-17 NOTE — PROGRESS NOTE ADULT - ATTENDING COMMENTS
Patient seen and examined this morning. Reports some nausea but no emesis. Abdomen is soft, appropriately tender with functioning colostomy which is healthy. DON drain with SS output. Will slowly advance diet and stop IVFs. Ambulation encouraged. Antibiotics per ID.

## 2020-02-17 NOTE — PROGRESS NOTE ADULT - SUBJECTIVE AND OBJECTIVE BOX
Date of service: 02-17-20 @ 10:40      Patient sitting in chair; slow to eat; drinking water  Afebrile      ROS: no fever or chills; denies dizziness, no HA, no SOB or cough, no abdominal pain, no diarrhea or constipation; no dysuria, no urinary frequency, no legs pain, no rashes    MEDICATIONS  (STANDING):  enoxaparin Injectable 40 milliGRAM(s) SubCutaneous daily  ertapenem  IVPB 1000 milliGRAM(s) IV Intermittent every 24 hours  tamsulosin 0.4 milliGRAM(s) Oral at bedtime    MEDICATIONS  (PRN):  acetaminophen   Tablet .. 650 milliGRAM(s) Oral every 6 hours PRN Temp greater or equal to 38C (100.4F), Mild Pain (1 - 3)  HYDROmorphone  Injectable 1 milliGRAM(s) IV Push every 4 hours PRN Severe Pain (7 - 10)  ketorolac   Injectable 15 milliGRAM(s) IV Push every 8 hours PRN Moderate Pain (4 - 6)  ondansetron Injectable 4 milliGRAM(s) IV Push every 6 hours PRN Nausea and/or Vomiting      Vital Signs Last 24 Hrs  T(C): 36.8 (17 Feb 2020 09:09), Max: 37.3 (16 Feb 2020 15:50)  T(F): 98.2 (17 Feb 2020 09:09), Max: 99.1 (16 Feb 2020 15:50)  HR: 86 (17 Feb 2020 09:09) (86 - 104)  BP: 124/74 (17 Feb 2020 09:09) (120/68 - 137/84)  BP(mean): 96 (16 Feb 2020 14:39) (96 - 96)  RR: 18 (17 Feb 2020 09:09) (10 - 20)  SpO2: 100% (17 Feb 2020 09:09) (95% - 100%)    Physical Exam:          Constitutional:  No acute distress  HEENT: NC/AT, EOMI, PERRLA, conjunctivae clear  Neck: supple; thyroid not palpable  Back: no tenderness  Respiratory: respiratory effort normal; clear to auscultation  Cardiovascular: S1S2 regular, no murmurs  Abdomen: soft, ostomy in place  Genitourinary: no suprapubic tenderness  Musculoskeletal: no muscle tenderness, no joint swelling or tenderness  Extremities: no pedal edema  Neurological/ Psychiatric: AxOx3, moving all extremities  Skin: no rashes; no palpable lesions    Labs: reviewed                 Labs:                        12.3   5.19  )-----------( 220      ( 17 Feb 2020 07:48 )             36.0     02-17    143  |  111<H>  |  10  ----------------------------<  101<H>  4.0   |  28  |  0.71    Ca    8.7      17 Feb 2020 07:48  Phos  3.2     02-17  Mg     2.0     02-17             Cultures:       Culture - Fungal, Other (collected 02-13-20 @ 12:43)  Source: .Other #2 INTRA ABDOMINAL ABSCESS  Preliminary Report (02-14-20 @ 07:54):    Testing in progress    Culture - Surgical Swab (collected 02-13-20 @ 12:43)  Source: .Surgical Swab #2 INTRA ABDOMINAL ABSCESS  Preliminary Report (02-15-20 @ 20:25):    Rare Citrobacter freundii complex    Growth in fluid media only Clostridium tertium "Susceptibilities not    performed"  Organism: Citrobacter freundii complex (02-15-20 @ 19:16)  Organism: Citrobacter freundii complex (02-15-20 @ 19:16)      -  Amikacin: S <=16      -  Amoxicillin/Clavulanic Acid: R 16/8      -  Ampicillin: R <=8 These ampicillin results predict results for amoxicillin      -  Ampicillin/Sulbactam: R <=4/2 Enterobacter, Citrobacter, and Serratia may develop resistance during prolonged therapy (3-4 days)      -  Aztreonam: S <=4      -  Cefazolin: R >16 Enterobacter, Citrobacter, and Serratia may develop resistance during prolonged therapy (3-4 days)      -  Cefepime: S <=2      -  Cefoxitin: R >16      -  Ceftriaxone: S <=1 Enterobacter, Citrobacter, and Serratia may develop resistance during prolonged therapy      -  Ciprofloxacin: S <=1      -  Ertapenem: S <=0.5      -  Gentamicin: S <=2      -  Imipenem: S <=1      -  Levofloxacin: S <=2      -  Meropenem: S <=1      -  Piperacillin/Tazobactam: S <=8      -  Tobramycin: S <=2      -  Trimethoprim/Sulfamethoxazole: S <=2/38      Method Type: REBECCA    Culture - Fungal, Other (collected 02-13-20 @ 12:43)  Source: .Other #1 INTRA ABDOMINAL ABSCESS  Preliminary Report (02-14-20 @ 07:54):    Testing in progress    Culture - Surgical Swab (collected 02-13-20 @ 12:43)  Source: .Surgical Swab #1 INTRA ABDOMINAL ABSCESS  Final Report (02-16-20 @ 20:16):    Rare Clostridium tertium "Susceptibilities not performed"            Radiology: all available radiological tests reviewed    < from: CT Abdomen and Pelvis w/ IV Cont (02.12.20 @ 10:17) >  Diverticulitis involving the mid sigmoid colon with an interval improvement in inflammatory changes adjacent to the mid sigmoid colon since 1/25/2020.  Droplets of free air adjacent to the sigmoid colon and additional droplets of free air in the upper abdomen; free air previously not seen in the upper abdomen on 1/25/2020.  New small amount of ascites.    < end of copied text >      Advanced directives addressed: full resuscitation

## 2020-02-17 NOTE — PROGRESS NOTE ADULT - SUBJECTIVE AND OBJECTIVE BOX
Patient seen and examined at bedside on am rounds with the surgery team. post op from [facility]  Ostomy is functional   DON drain w/ serosanguinous fluid        Vital Signs Last 24 Hrs  T(C): 37.2 (16 Feb 2020 21:21), Max: 37.3 (16 Feb 2020 15:50)  T(F): 98.9 (16 Feb 2020 21:21), Max: 99.1 (16 Feb 2020 15:50)  HR: 94 (16 Feb 2020 21:21) (94 - 105)  BP: 120/68 (16 Feb 2020 21:21) (120/68 - 137/84)  BP(mean): 96 (16 Feb 2020 14:39) (96 - 96)  RR: 18 (16 Feb 2020 21:21) (10 - 20)  SpO2: 95% (16 Feb 2020 21:21) (95% - 97%)    NAD, AOx3  S1 s2  CTAB  soft, tender, Sherice's ostomy patent and viable,  Ostomy and DON drain in place     02-17    143  |  111<H>  |  10  ----------------------------<  101<H>  4.0   |  28  |  0.71    Ca    8.7      17 Feb 2020 07:48  Phos  3.2     02-17  Mg     2.0     02-17                          12.3   5.19  )-----------( 220      ( 17 Feb 2020 07:48 )             36.0 Patient seen and examined at bedside on am rounds with the surgery team. post op from [facility]  Ostomy is functional   DON drain w/ serosanguinous fluid        Vital Signs Last 24 Hrs  T(C): 37.2 (16 Feb 2020 21:21), Max: 37.3 (16 Feb 2020 15:50)  T(F): 98.9 (16 Feb 2020 21:21), Max: 99.1 (16 Feb 2020 15:50)  HR: 94 (16 Feb 2020 21:21) (94 - 105)  BP: 120/68 (16 Feb 2020 21:21) (120/68 - 137/84)  BP(mean): 96 (16 Feb 2020 14:39) (96 - 96)  RR: 18 (16 Feb 2020 21:21) (10 - 20)  SpO2: 95% (16 Feb 2020 21:21) (95% - 97%)    NAD, AOx3  S1 s2  CTAB  soft, tender, Sherice's ostomy patent and viable,  Ostomy and DON drain in place     02-17    143  |  111<H>  |  10  ----------------------------<  101<H>  4.0   |  28  |  0.71    Ca    8.7      17 Feb 2020 07:48  Phos  3.2     02-17  Mg     2.0     02-17                          12.3   5.19  )-----------( 220      ( 17 Feb 2020 07:48 )             36.0              MEDICATIONS  (STANDING):  enoxaparin Injectable 40 milliGRAM(s) SubCutaneous daily  ertapenem  IVPB 1000 milliGRAM(s) IV Intermittent every 24 hours  tamsulosin 0.4 milliGRAM(s) Oral at bedtime

## 2020-02-18 LAB
ANION GAP SERPL CALC-SCNC: 4 MMOL/L — LOW (ref 5–17)
BUN SERPL-MCNC: 13 MG/DL — SIGNIFICANT CHANGE UP (ref 7–23)
CALCIUM SERPL-MCNC: 9 MG/DL — SIGNIFICANT CHANGE UP (ref 8.5–10.1)
CHLORIDE SERPL-SCNC: 106 MMOL/L — SIGNIFICANT CHANGE UP (ref 96–108)
CO2 SERPL-SCNC: 30 MMOL/L — SIGNIFICANT CHANGE UP (ref 22–31)
CREAT SERPL-MCNC: 0.79 MG/DL — SIGNIFICANT CHANGE UP (ref 0.5–1.3)
GLUCOSE SERPL-MCNC: 87 MG/DL — SIGNIFICANT CHANGE UP (ref 70–99)
HCT VFR BLD CALC: 36.9 % — LOW (ref 39–50)
HGB BLD-MCNC: 12.5 G/DL — LOW (ref 13–17)
MAGNESIUM SERPL-MCNC: 2.1 MG/DL — SIGNIFICANT CHANGE UP (ref 1.6–2.6)
MCHC RBC-ENTMCNC: 29.3 PG — SIGNIFICANT CHANGE UP (ref 27–34)
MCHC RBC-ENTMCNC: 33.9 GM/DL — SIGNIFICANT CHANGE UP (ref 32–36)
MCV RBC AUTO: 86.4 FL — SIGNIFICANT CHANGE UP (ref 80–100)
PHOSPHATE SERPL-MCNC: 3.7 MG/DL — SIGNIFICANT CHANGE UP (ref 2.5–4.5)
PLATELET # BLD AUTO: 223 K/UL — SIGNIFICANT CHANGE UP (ref 150–400)
POTASSIUM SERPL-MCNC: 3.6 MMOL/L — SIGNIFICANT CHANGE UP (ref 3.5–5.3)
POTASSIUM SERPL-SCNC: 3.6 MMOL/L — SIGNIFICANT CHANGE UP (ref 3.5–5.3)
RBC # BLD: 4.27 M/UL — SIGNIFICANT CHANGE UP (ref 4.2–5.8)
RBC # FLD: 12.7 % — SIGNIFICANT CHANGE UP (ref 10.3–14.5)
SODIUM SERPL-SCNC: 140 MMOL/L — SIGNIFICANT CHANGE UP (ref 135–145)
WBC # BLD: 5.56 K/UL — SIGNIFICANT CHANGE UP (ref 3.8–10.5)
WBC # FLD AUTO: 5.56 K/UL — SIGNIFICANT CHANGE UP (ref 3.8–10.5)

## 2020-02-18 PROCEDURE — 99024 POSTOP FOLLOW-UP VISIT: CPT

## 2020-02-18 RX ORDER — OXYCODONE HYDROCHLORIDE 5 MG/1
5 TABLET ORAL ONCE
Refills: 0 | Status: DISCONTINUED | OUTPATIENT
Start: 2020-02-18 | End: 2020-02-18

## 2020-02-18 RX ADMIN — OXYCODONE HYDROCHLORIDE 5 MILLIGRAM(S): 5 TABLET ORAL at 13:27

## 2020-02-18 RX ADMIN — HYDROMORPHONE HYDROCHLORIDE 1 MILLIGRAM(S): 2 INJECTION INTRAMUSCULAR; INTRAVENOUS; SUBCUTANEOUS at 16:35

## 2020-02-18 RX ADMIN — ERTAPENEM SODIUM 120 MILLIGRAM(S): 1 INJECTION, POWDER, LYOPHILIZED, FOR SOLUTION INTRAMUSCULAR; INTRAVENOUS at 02:04

## 2020-02-18 RX ADMIN — OXYCODONE HYDROCHLORIDE 5 MILLIGRAM(S): 5 TABLET ORAL at 13:57

## 2020-02-18 RX ADMIN — ENOXAPARIN SODIUM 40 MILLIGRAM(S): 100 INJECTION SUBCUTANEOUS at 11:08

## 2020-02-18 RX ADMIN — Medication 650 MILLIGRAM(S): at 07:32

## 2020-02-18 RX ADMIN — HYDROMORPHONE HYDROCHLORIDE 1 MILLIGRAM(S): 2 INJECTION INTRAMUSCULAR; INTRAVENOUS; SUBCUTANEOUS at 16:21

## 2020-02-18 RX ADMIN — Medication 650 MILLIGRAM(S): at 02:11

## 2020-02-18 RX ADMIN — Medication 650 MILLIGRAM(S): at 08:00

## 2020-02-18 RX ADMIN — ERTAPENEM SODIUM 120 MILLIGRAM(S): 1 INJECTION, POWDER, LYOPHILIZED, FOR SOLUTION INTRAMUSCULAR; INTRAVENOUS at 22:34

## 2020-02-18 RX ADMIN — TAMSULOSIN HYDROCHLORIDE 0.4 MILLIGRAM(S): 0.4 CAPSULE ORAL at 22:33

## 2020-02-18 RX ADMIN — Medication 650 MILLIGRAM(S): at 02:41

## 2020-02-18 NOTE — PROGRESS NOTE ADULT - SUBJECTIVE AND OBJECTIVE BOX
Patient seen and examined at bedside on am rounds . Ostomy is functional. Tolerating diet   DON drain w/ serosanguinous fluid      ICU Vital Signs Last 24 Hrs  T(C): 36.9 (18 Feb 2020 08:34), Max: 36.9 (18 Feb 2020 08:34)  T(F): 98.5 (18 Feb 2020 08:34), Max: 98.5 (18 Feb 2020 08:34)  HR: 91 (18 Feb 2020 08:34) (81 - 91)  BP: 132/76 (18 Feb 2020 08:34) (124/74 - 132/76)  BP(mean): --  ABP: --  ABP(mean): --  RR: 18 (18 Feb 2020 08:34) (18 - 18)  SpO2: 97% (18 Feb 2020 08:34) (96% - 100%)      NAD, AOx3  S1 s2  CTAB  soft, tender, Sherice's ostomy patent and viable,  Ostomy and DON drain in place                             12.5   5.56  )-----------( 223      ( 18 Feb 2020 07:11 )             36.9   02-18    140  |  106  |  13  ----------------------------<  87  3.6   |  30  |  0.79    Ca    9.0      18 Feb 2020 07:11  Phos  3.7     02-18  Mg     2.1     02-18 Patient seen and examined at bedside on am rounds . Ostomy is functional. Tolerating diet   DON drain w/ serosanguinous fluid      ICU Vital Signs Last 24 Hrs  T(C): 36.9 (18 Feb 2020 08:34), Max: 36.9 (18 Feb 2020 08:34)  T(F): 98.5 (18 Feb 2020 08:34), Max: 98.5 (18 Feb 2020 08:34)  HR: 91 (18 Feb 2020 08:34) (81 - 91)  BP: 132/76 (18 Feb 2020 08:34) (124/74 - 132/76)  RR: 18 (18 Feb 2020 08:34) (18 - 18)  SpO2: 97% (18 Feb 2020 08:34) (96% - 100%)      NAD, AOx3  S1 s2  CTAB  soft, tender, Sherice's ostomy patent and viable,  Ostomy and DON drain in place                             12.5   5.56  )-----------( 223      ( 18 Feb 2020 07:11 )             36.9   02-18    140  |  106  |  13  ----------------------------<  87  3.6   |  30  |  0.79    Ca    9.0      18 Feb 2020 07:11  Phos  3.7     02-18  Mg     2.1     02-18

## 2020-02-18 NOTE — PROGRESS NOTE ADULT - SUBJECTIVE AND OBJECTIVE BOX
Date of service: 02-18-20 @ 09:44    Patient sitting in chair; tolerating small amounts of food; afebrile        ROS: no fever or chills; denies dizziness, no HA, no SOB or cough, no abdominal pain, no diarrhea or constipation; no dysuria, no urinary frequency, no legs pain, no rashes    MEDICATIONS  (STANDING):  enoxaparin Injectable 40 milliGRAM(s) SubCutaneous daily  ertapenem  IVPB 1000 milliGRAM(s) IV Intermittent every 24 hours  tamsulosin 0.4 milliGRAM(s) Oral at bedtime    MEDICATIONS  (PRN):  acetaminophen   Tablet .. 650 milliGRAM(s) Oral every 6 hours PRN Temp greater or equal to 38C (100.4F), Mild Pain (1 - 3)  HYDROmorphone  Injectable 1 milliGRAM(s) IV Push every 4 hours PRN Severe Pain (7 - 10)  ondansetron Injectable 4 milliGRAM(s) IV Push every 6 hours PRN Nausea and/or Vomiting      Vital Signs Last 24 Hrs  T(C): 36.9 (18 Feb 2020 08:34), Max: 36.9 (18 Feb 2020 08:34)  T(F): 98.5 (18 Feb 2020 08:34), Max: 98.5 (18 Feb 2020 08:34)  HR: 91 (18 Feb 2020 08:34) (81 - 91)  BP: 132/76 (18 Feb 2020 08:34) (127/67 - 132/76)  BP(mean): --  RR: 18 (18 Feb 2020 08:34) (18 - 18)  SpO2: 97% (18 Feb 2020 08:34) (96% - 97%)    Physical Exam:      Constitutional:  No acute distress  HEENT: NC/AT, EOMI, PERRLA, conjunctivae clear  Neck: supple; thyroid not palpable  Back: no tenderness  Respiratory: respiratory effort normal; clear to auscultation  Cardiovascular: S1S2 regular, no murmurs  Abdomen: soft, ostomy in place  Genitourinary: no suprapubic tenderness  Musculoskeletal: no muscle tenderness, no joint swelling or tenderness  Extremities: no pedal edema  Neurological/ Psychiatric: AxOx3, moving all extremities  Skin: no rashes; no palpable lesions    Labs: reviewed                 Labs:           Labs:                        12.5   5.56  )-----------( 223      ( 18 Feb 2020 07:11 )             36.9     02-18    140  |  106  |  13  ----------------------------<  87  3.6   |  30  |  0.79    Ca    9.0      18 Feb 2020 07:11  Phos  3.7     02-18  Mg     2.1     02-18             Cultures:       Culture - Fungal, Other (collected 02-13-20 @ 12:43)  Source: .Other #2 INTRA ABDOMINAL ABSCESS  Preliminary Report (02-14-20 @ 07:54):    Testing in progress    Culture - Surgical Swab (collected 02-13-20 @ 12:43)  Source: .Surgical Swab #2 INTRA ABDOMINAL ABSCESS  Preliminary Report (02-15-20 @ 20:25):    Rare Citrobacter freundii complex    Growth in fluid media only Clostridium tertium "Susceptibilities not    performed"  Organism: Citrobacter freundii complex (02-15-20 @ 19:16)  Organism: Citrobacter freundii complex (02-15-20 @ 19:16)      -  Amikacin: S <=16      -  Amoxicillin/Clavulanic Acid: R 16/8      -  Ampicillin: R <=8 These ampicillin results predict results for amoxicillin      -  Ampicillin/Sulbactam: R <=4/2 Enterobacter, Citrobacter, and Serratia may develop resistance during prolonged therapy (3-4 days)      -  Aztreonam: S <=4      -  Cefazolin: R >16 Enterobacter, Citrobacter, and Serratia may develop resistance during prolonged therapy (3-4 days)      -  Cefepime: S <=2      -  Cefoxitin: R >16      -  Ceftriaxone: S <=1 Enterobacter, Citrobacter, and Serratia may develop resistance during prolonged therapy      -  Ciprofloxacin: S <=1      -  Ertapenem: S <=0.5      -  Gentamicin: S <=2      -  Imipenem: S <=1      -  Levofloxacin: S <=2      -  Meropenem: S <=1      -  Piperacillin/Tazobactam: S <=8      -  Tobramycin: S <=2      -  Trimethoprim/Sulfamethoxazole: S <=2/38      Method Type: REBECCA    Culture - Fungal, Other (collected 02-13-20 @ 12:43)  Source: .Other #1 INTRA ABDOMINAL ABSCESS  Preliminary Report (02-14-20 @ 07:54):    Testing in progress    Culture - Surgical Swab (collected 02-13-20 @ 12:43)  Source: .Surgical Swab #1 INTRA ABDOMINAL ABSCESS  Final Report (02-16-20 @ 20:16):    Rare Clostridium tertium "Susceptibilities not performed"            Radiology: all available radiological tests reviewed    < from: CT Abdomen and Pelvis w/ IV Cont (02.12.20 @ 10:17) >  Diverticulitis involving the mid sigmoid colon with an interval improvement in inflammatory changes adjacent to the mid sigmoid colon since 1/25/2020.  Droplets of free air adjacent to the sigmoid colon and additional droplets of free air in the upper abdomen; free air previously not seen in the upper abdomen on 1/25/2020.  New small amount of ascites.    < end of copied text >      Advanced directives addressed: full resuscitation

## 2020-02-18 NOTE — PROGRESS NOTE ADULT - ASSESSMENT
44  Y/M post op day 5 for [facility]'s       - reg diet  - Serial abdominal exams  - GI/DVT ppx  -dc planning in AM    d/w Dr Plaza

## 2020-02-18 NOTE — PROGRESS NOTE ADULT - ASSESSMENT
43 y/o Male with no significant PMH was admitted on 2/12 for lower abdominal pain x one day. The patient developed abdomina pain 3 weeks PTA and workup showed acute diverticulitis. He was treated wit hIV abx for 3 days followed by oral cipo and metronidazole for 2 more weeks. His symptoms resolved and he was well for another week after he completed his oral abx. His abdominal pain recurred the day PTA. Patient admits pain 7/10 in intensity and has progressively worsening. Patient denies nausea, vomiting, fevers, chills. In ER he received zosyn.     1. Low grade fever. Acute sigmoid, recurrent diverticulitis with intramural abscess s/p celectomy. Acute purulent peritonitis.  -abdominal pain is improving  -surgical evaluation appreciated  -monitor abdomen closely  -on ertapenem 1 gm IV qd # 7 which will cover polymicrobial infection including the organisms found in OR cultures  - when ready for discharge expect to be able to discharge on ciprofloxacin 500 mg po q 12 hours and flagyl 500 mg po q 8 hours for 7 more days  -tolerating abx well so far; no side effects noted  -local wound care  -continue abx coverage  -pain management  -monitor temps  -f/u CBC  -supportive care  - wound care and diet per surgery  2. Other issues:   -care per medicine

## 2020-02-18 NOTE — PROGRESS NOTE ADULT - ATTENDING COMMENTS
Patient seen and examined this morning. Reports some nausea but no emesis. Abdomen is soft, appropriately tender with functioning colostomy which is healthy. DON drain with SS output. Will slowly advance diet and stop IVFs. Ambulation encouraged. Antibiotics per ID. Doing well. Colostomy functioning. Will advance to regular diet. Plan to remove DON tomorrow and discharge home with home care. Ostomy teaching.

## 2020-02-18 NOTE — ADVANCED PRACTICE NURSE CONSULT - ASSESSMENT
This is a 44 year old male that was admitted to the hospital on 2/12/2020 for abdominal pain. No PMH.     In to continue ostomy education with patient. Patient Alert and Oriented x4. Denies any pain at this time.   Patient with a Sherice's procedure on 2/13/2020 from perforated diverticulitis.     Patient currently denies any questions at this time and has his ostomy education folder out to review.     Stoma located on the LLQ and is pink, moist, viable and nicely protruded. Brown mushy stool and flatus noted to pouch. Wafer and pouch intact. Charline incisional managements dressing in place. Abdomen soft and tender to touch. DON drain w/ serosanguinous fluid.    Patient remains sitting in chair at the bedside.     [facility] [employer] start enrollment completed and hospital discharge bag given to patient

## 2020-02-19 ENCOUNTER — TRANSCRIPTION ENCOUNTER (OUTPATIENT)
Age: 45
End: 2020-02-19

## 2020-02-19 VITALS
SYSTOLIC BLOOD PRESSURE: 123 MMHG | OXYGEN SATURATION: 98 % | TEMPERATURE: 98 F | HEART RATE: 93 BPM | RESPIRATION RATE: 18 BRPM | DIASTOLIC BLOOD PRESSURE: 74 MMHG

## 2020-02-19 DIAGNOSIS — K57.80 DIVERTICULITIS OF INTESTINE, PART UNSPECIFIED, WITH PERFORATION AND ABSCESS W/OUT BLEEDING: ICD-10-CM

## 2020-02-19 LAB
ANION GAP SERPL CALC-SCNC: 5 MMOL/L — SIGNIFICANT CHANGE UP (ref 5–17)
BUN SERPL-MCNC: 12 MG/DL — SIGNIFICANT CHANGE UP (ref 7–23)
CALCIUM SERPL-MCNC: 9.1 MG/DL — SIGNIFICANT CHANGE UP (ref 8.5–10.1)
CHLORIDE SERPL-SCNC: 105 MMOL/L — SIGNIFICANT CHANGE UP (ref 96–108)
CO2 SERPL-SCNC: 29 MMOL/L — SIGNIFICANT CHANGE UP (ref 22–31)
CREAT SERPL-MCNC: 0.9 MG/DL — SIGNIFICANT CHANGE UP (ref 0.5–1.3)
GLUCOSE SERPL-MCNC: 137 MG/DL — HIGH (ref 70–99)
HCT VFR BLD CALC: 37.9 % — LOW (ref 39–50)
HGB BLD-MCNC: 12.9 G/DL — LOW (ref 13–17)
MAGNESIUM SERPL-MCNC: 2.2 MG/DL — SIGNIFICANT CHANGE UP (ref 1.6–2.6)
MCHC RBC-ENTMCNC: 29.6 PG — SIGNIFICANT CHANGE UP (ref 27–34)
MCHC RBC-ENTMCNC: 34 GM/DL — SIGNIFICANT CHANGE UP (ref 32–36)
MCV RBC AUTO: 86.9 FL — SIGNIFICANT CHANGE UP (ref 80–100)
PHOSPHATE SERPL-MCNC: 3.2 MG/DL — SIGNIFICANT CHANGE UP (ref 2.5–4.5)
PLATELET # BLD AUTO: 263 K/UL — SIGNIFICANT CHANGE UP (ref 150–400)
POTASSIUM SERPL-MCNC: 4.2 MMOL/L — SIGNIFICANT CHANGE UP (ref 3.5–5.3)
POTASSIUM SERPL-SCNC: 4.2 MMOL/L — SIGNIFICANT CHANGE UP (ref 3.5–5.3)
RBC # BLD: 4.36 M/UL — SIGNIFICANT CHANGE UP (ref 4.2–5.8)
RBC # FLD: 12.9 % — SIGNIFICANT CHANGE UP (ref 10.3–14.5)
SODIUM SERPL-SCNC: 139 MMOL/L — SIGNIFICANT CHANGE UP (ref 135–145)
WBC # BLD: 6.18 K/UL — SIGNIFICANT CHANGE UP (ref 3.8–10.5)
WBC # FLD AUTO: 6.18 K/UL — SIGNIFICANT CHANGE UP (ref 3.8–10.5)

## 2020-02-19 PROCEDURE — 99231 SBSQ HOSP IP/OBS SF/LOW 25: CPT

## 2020-02-19 RX ORDER — METRONIDAZOLE 500 MG
1 TABLET ORAL
Qty: 21 | Refills: 0
Start: 2020-02-19

## 2020-02-19 RX ORDER — CIPROFLOXACIN LACTATE 400MG/40ML
500 VIAL (ML) INTRAVENOUS EVERY 12 HOURS
Refills: 0 | Status: DISCONTINUED | OUTPATIENT
Start: 2020-02-19 | End: 2020-02-19

## 2020-02-19 RX ORDER — ACETAMINOPHEN 500 MG
2 TABLET ORAL
Qty: 0 | Refills: 0 | DISCHARGE
Start: 2020-02-19

## 2020-02-19 RX ORDER — MOXIFLOXACIN HYDROCHLORIDE TABLETS, 400 MG 400 MG/1
1 TABLET, FILM COATED ORAL
Qty: 14 | Refills: 0
Start: 2020-02-19

## 2020-02-19 RX ORDER — METRONIDAZOLE 500 MG
500 TABLET ORAL THREE TIMES A DAY
Refills: 0 | Status: DISCONTINUED | OUTPATIENT
Start: 2020-02-19 | End: 2020-02-19

## 2020-02-19 RX ADMIN — HYDROMORPHONE HYDROCHLORIDE 1 MILLIGRAM(S): 2 INJECTION INTRAMUSCULAR; INTRAVENOUS; SUBCUTANEOUS at 04:33

## 2020-02-19 RX ADMIN — Medication 650 MILLIGRAM(S): at 10:10

## 2020-02-19 RX ADMIN — ENOXAPARIN SODIUM 40 MILLIGRAM(S): 100 INJECTION SUBCUTANEOUS at 09:39

## 2020-02-19 RX ADMIN — HYDROMORPHONE HYDROCHLORIDE 1 MILLIGRAM(S): 2 INJECTION INTRAMUSCULAR; INTRAVENOUS; SUBCUTANEOUS at 04:22

## 2020-02-19 RX ADMIN — Medication 650 MILLIGRAM(S): at 09:39

## 2020-02-19 NOTE — DISCHARGE NOTE PROVIDER - NSDCMRMEDTOKEN_GEN_ALL_CORE_FT
acetaminophen 325 mg oral tablet: 2 tab(s) orally every 6 hours, As needed, Temp greater or equal to 38C (100.4F), Mild Pain (1 - 3)  Cipro 500 mg oral tablet: 1 tab(s) orally 2 times a day   Cipro 500 mg oral tablet: 1 tab(s) orally 2 times a day MDD:2   Flagyl 500 mg oral tablet: 1 tab(s) orally every 8 hours   metroNIDAZOLE 500 mg oral tablet: 1 tab(s) orally every 8 hours MDD:3  oxycodone-acetaminophen 5 mg-325 mg oral tablet: 1 tab(s) orally every 6 hours, As Needed -for severe pain MDD:4 tabs

## 2020-02-19 NOTE — PROGRESS NOTE ADULT - ATTENDING COMMENTS
Patient was seen and examined. Doing well. Reviewed post op cares with patient. ID recommends 7 more days of oral antibiotics. Will discharge home today with home care.

## 2020-02-19 NOTE — DISCHARGE NOTE NURSING/CASE MANAGEMENT/SOCIAL WORK - PATIENT PORTAL LINK FT
You can access the FollowMyHealth Patient Portal offered by [facility] by registering at the following website: [email or web address]. By joining [facility]’s FollowMyHealth portal, you will also be able to view your health information using other applications (apps) compatible with our system.

## 2020-02-19 NOTE — ADVANCED PRACTICE NURSE CONSULT - ASSESSMENT
This is a 44 year old male that was admitted to the hospital on 2/12/2020 for abdominal pain. No PMH.     In to continue ostomy education with patient and wife.  Patient Alert and Oriented x4. Denies any pain at this time.   Patient with a Sherice's procedure on 2/13/2020 from perforated diverticulitis.     Patient reports he is anxious about going home. Reviewed how the appliance is changed and how the pouch is removed by demonstrating with a new wafer and pouch. Once patient observed and discussed the process, he reports that this is not as bad as he thought it would be. Patient has been emptying pouch independently with RN observing him without difficulty     Stoma located on the LLQ and is pink, moist, viable and nicely protruded. Brown mushy stool and flatus noted to pouch. Wafer and pouch intact. Charline and DON removed today. New wafer and pouch placed by the surgical residents this morning.     Patient remains sitting in chair at the bedside.

## 2020-02-19 NOTE — PROGRESS NOTE ADULT - REASON FOR ADMISSION
Diverticulitis

## 2020-02-19 NOTE — PROGRESS NOTE ADULT - SUBJECTIVE AND OBJECTIVE BOX
Patient seen and examined at bedside on am rounds .  Reports having increasing abdominal pain overnight. Wants to stay one more day   PICCO dressing changed/ Ostomy bag replaced during AM around   Ostomy is functional. Tolerating diet   DON drain w/ serosanguinous fluid      Vital Signs Last 24 Hrs  T(C): 36.8 (18 Feb 2020 20:52), Max: 36.9 (18 Feb 2020 08:34)  T(F): 98.2 (18 Feb 2020 20:52), Max: 98.5 (18 Feb 2020 08:34)  HR: 97 (18 Feb 2020 20:52) (91 - 97)  BP: 117/30 (18 Feb 2020 20:52) (117/30 - 132/76)  BP(mean): --  RR: 18 (18 Feb 2020 20:52) (18 - 18)  SpO2: 93% (18 Feb 2020 20:52) (93% - 97%)      NAD, AOx3  S1 s2  CTAB  soft, tender, Sherice's ostomy patent and viable,  Ostomy and DON drain in place     02-19    139  |  105  |  12  ----------------------------<  137<H>  4.2   |  29  |  0.90    Ca    9.1      19 Feb 2020 06:26  Phos  3.2     02-19  Mg     2.2     02-19                            12.9   6.18  )-----------( 263      ( 19 Feb 2020 06:26 )             37.9 Patient seen and examined at bedside on am rounds .  Reports having increasing abdominal pain overnight. Wants to stay one more day   PICCO dressing changed/ Ostomy bag replaced during AM around   Ostomy is functional. Tolerating diet   DON drain w/ serosanguinous fluid      Vital Signs Last 24 Hrs  T(C): 36.8 (18 Feb 2020 20:52), Max: 36.9 (18 Feb 2020 08:34)  T(F): 98.2 (18 Feb 2020 20:52), Max: 98.5 (18 Feb 2020 08:34)  HR: 97 (18 Feb 2020 20:52) (91 - 97)  BP: 117/30 (18 Feb 2020 20:52) (117/30 - 132/76)  RR: 18 (18 Feb 2020 20:52) (18 - 18)  SpO2: 93% (18 Feb 2020 20:52) (93% - 97%)      NAD, AOx3  S1 s2  CTAB  soft, tender, Sherice's ostomy patent and viable,  Ostomy and DON drain in place     02-19    139  |  105  |  12  ----------------------------<  137<H>  4.2   |  29  |  0.90    Ca    9.1      19 Feb 2020 06:26  Phos  3.2     02-19  Mg     2.2     02-19                            12.9   6.18  )-----------( 263      ( 19 Feb 2020 06:26 )             37.9

## 2020-02-19 NOTE — PROGRESS NOTE ADULT - ASSESSMENT
44  Y/M post op day 6 for duncan's,       - PICCO dressing changed and Ostomy bag replaced.  - Continue reg diet  - Serial abdominal exams  - GI/DVT ppx  -dc planning today     d/w Dr Plaza

## 2020-02-19 NOTE — PROGRESS NOTE ADULT - SUBJECTIVE AND OBJECTIVE BOX
Date of service: 02-19-20 @ 10:22      Patient sitting in chair; afebrile, overall improved      ROS: no fever or chills; denies dizziness, no HA, no SOB or cough, no abdominal pain, no diarrhea or constipation; no dysuria, no urinary frequency, no legs pain, no rashes    MEDICATIONS  (STANDING):  enoxaparin Injectable 40 milliGRAM(s) SubCutaneous daily  ertapenem  IVPB 1000 milliGRAM(s) IV Intermittent every 24 hours  tamsulosin 0.4 milliGRAM(s) Oral at bedtime    MEDICATIONS  (PRN):  acetaminophen   Tablet .. 650 milliGRAM(s) Oral every 6 hours PRN Temp greater or equal to 38C (100.4F), Mild Pain (1 - 3)  HYDROmorphone  Injectable 1 milliGRAM(s) IV Push every 4 hours PRN Severe Pain (7 - 10)  ondansetron Injectable 4 milliGRAM(s) IV Push every 6 hours PRN Nausea and/or Vomiting      Vital Signs Last 24 Hrs  T(C): 36.8 (19 Feb 2020 08:44), Max: 36.8 (18 Feb 2020 20:52)  T(F): 98.2 (19 Feb 2020 08:44), Max: 98.2 (18 Feb 2020 20:52)  HR: 93 (19 Feb 2020 08:44) (93 - 97)  BP: 123/74 (19 Feb 2020 08:44) (117/30 - 123/74)  BP(mean): --  RR: 18 (19 Feb 2020 08:44) (18 - 18)  SpO2: 98% (19 Feb 2020 08:44) (93% - 98%)    Physical Exam:        Constitutional:  No acute distress  HEENT: NC/AT, EOMI, PERRLA, conjunctivae clear  Neck: supple; thyroid not palpable  Back: no tenderness  Respiratory: respiratory effort normal; clear to auscultation  Cardiovascular: S1S2 regular, no murmurs  Abdomen: soft, ostomy in place  Genitourinary: no suprapubic tenderness  Musculoskeletal: no muscle tenderness, no joint swelling or tenderness  Extremities: no pedal edema  Neurological/ Psychiatric: AxOx3, moving all extremities  Skin: no rashes; no palpable lesions    Labs: reviewed                 Labs:           Labs:                        Labs:                        12.9   6.18  )-----------( 263      ( 19 Feb 2020 06:26 )             37.9     02-19    139  |  105  |  12  ----------------------------<  137<H>  4.2   |  29  |  0.90    Ca    9.1      19 Feb 2020 06:26  Phos  3.2     02-19  Mg     2.2     02-19             Cultures:       Culture - Fungal, Other (collected 02-13-20 @ 12:43)  Source: .Other #2 INTRA ABDOMINAL ABSCESS  Preliminary Report (02-14-20 @ 07:54):    Testing in progress    Culture - Surgical Swab (collected 02-13-20 @ 12:43)  Source: .Surgical Swab #2 INTRA ABDOMINAL ABSCESS  Final Report (02-18-20 @ 15:06):    Rare Citrobacter freundii complex    Growth in fluid media only Clostridium tertium "Susceptibilities not    performed"  Organism: Citrobacter freundii complex (02-18-20 @ 15:06)  Organism: Citrobacter freundii complex (02-18-20 @ 15:06)      -  Amikacin: S <=16      -  Amoxicillin/Clavulanic Acid: R 16/8      -  Ampicillin: R <=8 These ampicillin results predict results for amoxicillin      -  Ampicillin/Sulbactam: R <=4/2 Enterobacter, Citrobacter, and Serratia may develop resistance during prolonged therapy (3-4 days)      -  Aztreonam: S <=4      -  Cefazolin: R >16 Enterobacter, Citrobacter, and Serratia may develop resistance during prolonged therapy (3-4 days)      -  Cefepime: S <=2      -  Cefoxitin: R >16      -  Ceftriaxone: S <=1 Enterobacter, Citrobacter, and Serratia may develop resistance during prolonged therapy      -  Ciprofloxacin: S <=1      -  Ertapenem: S <=0.5      -  Gentamicin: S <=2      -  Imipenem: S <=1      -  Levofloxacin: S <=2      -  Meropenem: S <=1      -  Piperacillin/Tazobactam: S <=8      -  Tobramycin: S <=2      -  Trimethoprim/Sulfamethoxazole: S <=2/38      Method Type: REBECCA    Culture - Fungal, Other (collected 02-13-20 @ 12:43)  Source: .Other #1 INTRA ABDOMINAL ABSCESS  Preliminary Report (02-14-20 @ 07:54):    Testing in progress    Culture - Surgical Swab (collected 02-13-20 @ 12:43)  Source: .Surgical Swab #1 INTRA ABDOMINAL ABSCESS  Final Report (02-16-20 @ 20:16):    Rare Clostridium tertium "Susceptibilities not performed"            Radiology: all available radiological tests reviewed    < from: CT Abdomen and Pelvis w/ IV Cont (02.12.20 @ 10:17) >  Diverticulitis involving the mid sigmoid colon with an interval improvement in inflammatory changes adjacent to the mid sigmoid colon since 1/25/2020.  Droplets of free air adjacent to the sigmoid colon and additional droplets of free air in the upper abdomen; free air previously not seen in the upper abdomen on 1/25/2020.  New small amount of ascites.    < end of copied text >      Advanced directives addressed: full resuscitation

## 2020-02-19 NOTE — PROGRESS NOTE ADULT - ASSESSMENT
45 y/o Male with no significant PMH was admitted on 2/12 for lower abdominal pain x one day. The patient developed abdomina pain 3 weeks PTA and workup showed acute diverticulitis. He was treated wit hIV abx for 3 days followed by oral cipo and metronidazole for 2 more weeks. His symptoms resolved and he was well for another week after he completed his oral abx. His abdominal pain recurred the day PTA. Patient admits pain 7/10 in intensity and has progressively worsening. Patient denies nausea, vomiting, fevers, chills. In ER he received zosyn.     1. Low grade fever. Acute sigmoid, recurrent diverticulitis with intramural abscess s/p celectomy. Acute purulent peritonitis.  -abdominal pain is improving  -surgical evaluation appreciated  -monitor abdomen closely  -on ertapenem 1 gm IV qd # 8 which will cover polymicrobial infection including the organisms found in OR cultures  - will change antibiotics to  ciprofloxacin 500 mg po q 12 hours and flagyl 500 mg po q 8 hours for 7 more days  -tolerating abx well so far; no side effects noted  -local wound care  -continue abx coverage  -pain management  -monitor temps  -f/u CBC  -supportive care  - wound care and diet per surgery  2. Other issues:   -care per medicine

## 2020-02-19 NOTE — DISCHARGE NOTE PROVIDER - CARE PROVIDER_API CALL
Vandana Neri)  ColonRectal Surgery; Surgery  [address] [place] [address]  Phone: [phone]  Fax: [phone]  Established Patient  Follow Up Time: 2 weeks

## 2020-02-19 NOTE — DISCHARGE NOTE PROVIDER - HOSPITAL COURSE
Patient presents with recurrent abdominal pain in light of known history diverticulitis. Pt was found to have complicated diverticulitis with intraabdominal abscess. Patient underwent duncan's procedure , Ostomy creation and DON drain placement. Patient has been  evaluated by ID team and underwent a course of IV abx treatment  and is to be discharge home with cipro/flagyl for additional 7 days.  Patient remains hemodynamically stable, received drain and ostomy care teaching. Patient is ready for home d/c with close follow up in colorectal surgery clinic.

## 2020-02-21 PROBLEM — K57.92 DIVERTICULITIS OF INTESTINE, PART UNSPECIFIED, WITHOUT PERFORATION OR ABSCESS WITHOUT BLEEDING: Chronic | Status: ACTIVE | Noted: 2020-02-12

## 2020-02-24 ENCOUNTER — APPOINTMENT (OUTPATIENT)
Dept: COLORECTAL SURGERY | Facility: CLINIC | Age: 45
End: 2020-02-24

## 2020-02-25 DIAGNOSIS — K57.20 DIVERTICULITIS OF LARGE INTESTINE WITH PERFORATION AND ABSCESS WITHOUT BLEEDING: ICD-10-CM

## 2020-02-25 DIAGNOSIS — K65.0 GENERALIZED (ACUTE) PERITONITIS: ICD-10-CM

## 2020-02-25 DIAGNOSIS — Z87.442 PERSONAL HISTORY OF URINARY CALCULI: ICD-10-CM

## 2020-02-25 DIAGNOSIS — A41.9 SEPSIS, UNSPECIFIED ORGANISM: ICD-10-CM

## 2020-03-05 ENCOUNTER — APPOINTMENT (OUTPATIENT)
Dept: COLORECTAL SURGERY | Facility: CLINIC | Age: 45
End: 2020-03-05
Payer: COMMERCIAL

## 2020-03-05 VITALS
SYSTOLIC BLOOD PRESSURE: 137 MMHG | BODY MASS INDEX: 28.44 KG/M2 | WEIGHT: 210 LBS | HEIGHT: 72 IN | DIASTOLIC BLOOD PRESSURE: 89 MMHG | HEART RATE: 91 BPM | OXYGEN SATURATION: 98 % | TEMPERATURE: 98.2 F

## 2020-03-05 DIAGNOSIS — Z82.49 FAMILY HISTORY OF ISCHEMIC HEART DISEASE AND OTHER DISEASES OF THE CIRCULATORY SYSTEM: ICD-10-CM

## 2020-03-05 DIAGNOSIS — Z83.79 FAMILY HISTORY OF OTHER DISEASES OF THE DIGESTIVE SYSTEM: ICD-10-CM

## 2020-03-05 DIAGNOSIS — Z09 ENCOUNTER FOR FOLLOW-UP EXAMINATION AFTER COMPLETED TREATMENT FOR CONDITIONS OTHER THAN MALIGNANT NEOPLASM: ICD-10-CM

## 2020-03-05 DIAGNOSIS — Z78.9 OTHER SPECIFIED HEALTH STATUS: ICD-10-CM

## 2020-03-05 DIAGNOSIS — Z80.7 FAMILY HISTORY OF OTHER MALIGNANT NEOPLASMS OF LYMPHOID, HEMATOPOIETIC AND RELATED TISSUES: ICD-10-CM

## 2020-03-05 PROCEDURE — 99024 POSTOP FOLLOW-UP VISIT: CPT

## 2020-03-05 RX ORDER — METRONIDAZOLE 500 MG/1
500 TABLET ORAL 3 TIMES DAILY
Qty: 21 | Refills: 0 | Status: DISCONTINUED | COMMUNITY
Start: 2020-02-19 | End: 2020-03-05

## 2020-03-05 RX ORDER — CIPROFLOXACIN HYDROCHLORIDE 500 MG/1
500 TABLET, FILM COATED ORAL TWICE DAILY
Qty: 14 | Refills: 0 | Status: DISCONTINUED | COMMUNITY
Start: 2020-02-19 | End: 2020-03-05

## 2020-03-05 NOTE — PHYSICAL EXAM
[No Rash or Lesion] : No rash or lesion [Alert] : alert [Oriented to Person] : oriented to person [Oriented to Place] : oriented to place [Oriented to Time] : oriented to time [Calm] : calm [de-identified] : incision CDI, colostomy healthy, soft, NTND [de-identified] : NAD [de-identified] : NCAT [de-identified] : REZA x 4

## 2020-03-05 NOTE — ASSESSMENT
[FreeTextEntry1] : Here for POV.\par Looks well.\par Energy slowly returning.\par Discussed limitations on activity.\par OK to resume more physical exertion at 3 months.\par Encourage high protein low fat foods.\par Anticipate reversal in 5-6 months to allow adhesions to soften.\par Colonoscopy before reversal.\par RTO June 2020

## 2020-03-05 NOTE — HISTORY OF PRESENT ILLNESS
[FreeTextEntry1] : Here for POV.\par Feels well. No pain.\par Eating without issues. Colostomy without issue.\par

## 2020-07-07 ENCOUNTER — APPOINTMENT (OUTPATIENT)
Dept: COLORECTAL SURGERY | Facility: CLINIC | Age: 45
End: 2020-07-07

## 2020-07-07 DIAGNOSIS — Z12.11 ENCOUNTER FOR SCREENING FOR MALIGNANT NEOPLASM OF COLON: ICD-10-CM

## 2020-07-07 NOTE — ASSESSMENT
[FreeTextEntry1] : Mr. Lance presents to the office for discussion re: reversal of his colostomy.\par Colonoscopy will need to be completed before colostomy reversal.\par The risks/benefits/alternatives for a colonoscopy were discussed. These include a less than 1% risk of bleeding should any polyps be biopsied and/or removed. There is also a less than 0.1% risk of perforation. The patient understands the need to adhere to a clear liquid diet the day prior to procedure as well as having to perform a bowel prep in order to allow for adequate visualization of the mucosal surfaces.  Followup colonoscopies will be scheduled based on the findings that are seen at the time of the procedure. Patient understands and is agreeable, and will proceed with consent and scheduling.\par \par I also reviewed with her the three potential outcomes of colostomy reversal:\par 1) Inability to reverse the colostomy because of a frozen pelvis\par 2 ) ability to reverse the colostomy, but with an associated low pelvic anastomosis which would require a diverting ileostomy to avoid consequences of an increased chance of anastomotic leak with pelvic sepsis\par 3) reversal of the colostomy \par  \par He is also aware of the need to perform an antibiotic and mechanical bowel prep. Duration of procedure, length of hospital stay, period of convalescence including limitations of activity were all reviewed.   All questions were answered to her satisfaction.\par

## 2020-07-07 NOTE — PHYSICAL EXAM
[Alert] : alert [No Rash or Lesion] : No rash or lesion [Oriented to Person] : oriented to person [Oriented to Place] : oriented to place [Oriented to Time] : oriented to time [Calm] : calm [de-identified] : colostomy healthy, soft, NTND [de-identified] : NCAT [de-identified] : REZA x 4 [de-identified] : NAD

## 2020-07-21 ENCOUNTER — APPOINTMENT (OUTPATIENT)
Dept: COLORECTAL SURGERY | Facility: CLINIC | Age: 45
End: 2020-07-21
Payer: COMMERCIAL

## 2020-07-21 VITALS
TEMPERATURE: 98.3 F | BODY MASS INDEX: 14.9 KG/M2 | HEART RATE: 90 BPM | WEIGHT: 110 LBS | RESPIRATION RATE: 14 BRPM | SYSTOLIC BLOOD PRESSURE: 136 MMHG | DIASTOLIC BLOOD PRESSURE: 55 MMHG | HEIGHT: 72 IN

## 2020-07-21 PROCEDURE — 99443: CPT

## 2020-08-05 ENCOUNTER — APPOINTMENT (OUTPATIENT)
Dept: COLORECTAL SURGERY | Facility: CLINIC | Age: 45
End: 2020-08-05

## 2020-08-06 ENCOUNTER — RESULT CHARGE (OUTPATIENT)
Age: 45
End: 2020-08-06

## 2020-08-25 ENCOUNTER — APPOINTMENT (OUTPATIENT)
Dept: COLORECTAL SURGERY | Facility: HOSPITAL | Age: 45
End: 2020-08-25

## 2020-09-09 ENCOUNTER — APPOINTMENT (OUTPATIENT)
Dept: COLORECTAL SURGERY | Facility: HOSPITAL | Age: 45
End: 2020-09-09

## 2020-12-23 PROBLEM — Z12.11 ENCOUNTER FOR SCREENING COLONOSCOPY: Status: RESOLVED | Noted: 2020-07-07 | Resolved: 2020-12-23

## 2021-03-01 NOTE — ED ADULT TRIAGE NOTE - PAIN: PRESENCE, MLM
Accompanied patient and her son in the follow-up with dr. Mccall. Dr. Mccall reviewed the PET scan and the results from the bone marrow biopsy with them. He also discussed treatment options. Patient does not want to have radiation. Plan is for:  1. Authorize rituxan  2. Once approved, teaching will be scheduled  3.start weekly rituxan  They understood and were agreeable to this plan.    All questions were answered and they verbalized understanding.    Patient seemed to be coping appropriately. Emotional support and encouragement given.    I encouraged them to contact me with any questions or concerns.    complains of pain/discomfort

## 2021-04-03 DIAGNOSIS — Z01.818 ENCOUNTER FOR OTHER PREPROCEDURAL EXAMINATION: ICD-10-CM

## 2021-04-04 ENCOUNTER — APPOINTMENT (OUTPATIENT)
Dept: DISASTER EMERGENCY | Facility: CLINIC | Age: 46
End: 2021-04-04

## 2021-04-04 LAB — SARS-COV-2 N GENE NPH QL NAA+PROBE: NOT DETECTED

## 2021-04-08 ENCOUNTER — APPOINTMENT (OUTPATIENT)
Dept: COLORECTAL SURGERY | Facility: CLINIC | Age: 46
End: 2021-04-08
Payer: COMMERCIAL

## 2021-04-08 PROCEDURE — 44388 COLONOSCOPY THRU STOMA SPX: CPT

## 2021-04-21 ENCOUNTER — OUTPATIENT (OUTPATIENT)
Dept: OUTPATIENT SERVICES | Facility: HOSPITAL | Age: 46
LOS: 1 days | End: 2021-04-21
Payer: COMMERCIAL

## 2021-04-21 VITALS
HEART RATE: 92 BPM | RESPIRATION RATE: 16 BRPM | DIASTOLIC BLOOD PRESSURE: 79 MMHG | SYSTOLIC BLOOD PRESSURE: 141 MMHG | OXYGEN SATURATION: 99 % | HEIGHT: 72 IN | WEIGHT: 229.06 LBS | TEMPERATURE: 97 F

## 2021-04-21 DIAGNOSIS — Z90.49 ACQUIRED ABSENCE OF OTHER SPECIFIED PARTS OF DIGESTIVE TRACT: Chronic | ICD-10-CM

## 2021-04-21 DIAGNOSIS — Z29.9 ENCOUNTER FOR PROPHYLACTIC MEASURES, UNSPECIFIED: ICD-10-CM

## 2021-04-21 DIAGNOSIS — Z01.818 ENCOUNTER FOR OTHER PREPROCEDURAL EXAMINATION: ICD-10-CM

## 2021-04-21 DIAGNOSIS — Z43.3 ENCOUNTER FOR ATTENTION TO COLOSTOMY: ICD-10-CM

## 2021-04-21 LAB
A1C WITH ESTIMATED AVERAGE GLUCOSE RESULT: 5.6 % — SIGNIFICANT CHANGE UP (ref 4–5.6)
ANION GAP SERPL CALC-SCNC: 4 MMOL/L — LOW (ref 5–17)
APTT BLD: 38.6 SEC — HIGH (ref 27.5–35.5)
BASOPHILS # BLD AUTO: 0.02 K/UL — SIGNIFICANT CHANGE UP (ref 0–0.2)
BASOPHILS NFR BLD AUTO: 0.3 % — SIGNIFICANT CHANGE UP (ref 0–2)
BUN SERPL-MCNC: 17 MG/DL — SIGNIFICANT CHANGE UP (ref 7–23)
CALCIUM SERPL-MCNC: 9.7 MG/DL — SIGNIFICANT CHANGE UP (ref 8.5–10.1)
CHLORIDE SERPL-SCNC: 105 MMOL/L — SIGNIFICANT CHANGE UP (ref 96–108)
CO2 SERPL-SCNC: 29 MMOL/L — SIGNIFICANT CHANGE UP (ref 22–31)
CREAT SERPL-MCNC: 1.19 MG/DL — SIGNIFICANT CHANGE UP (ref 0.5–1.3)
EOSINOPHIL # BLD AUTO: 0.07 K/UL — SIGNIFICANT CHANGE UP (ref 0–0.5)
EOSINOPHIL NFR BLD AUTO: 1 % — SIGNIFICANT CHANGE UP (ref 0–6)
ESTIMATED AVERAGE GLUCOSE: 114 MG/DL — SIGNIFICANT CHANGE UP (ref 68–114)
GLUCOSE SERPL-MCNC: 88 MG/DL — SIGNIFICANT CHANGE UP (ref 70–99)
HCT VFR BLD CALC: 47.1 % — SIGNIFICANT CHANGE UP (ref 39–50)
HGB BLD-MCNC: 16.3 G/DL — SIGNIFICANT CHANGE UP (ref 13–17)
IMM GRANULOCYTES NFR BLD AUTO: 0.3 % — SIGNIFICANT CHANGE UP (ref 0–1.5)
INR BLD: 1.07 RATIO — SIGNIFICANT CHANGE UP (ref 0.88–1.16)
LYMPHOCYTES # BLD AUTO: 1.87 K/UL — SIGNIFICANT CHANGE UP (ref 1–3.3)
LYMPHOCYTES # BLD AUTO: 26.6 % — SIGNIFICANT CHANGE UP (ref 13–44)
MCHC RBC-ENTMCNC: 29.2 PG — SIGNIFICANT CHANGE UP (ref 27–34)
MCHC RBC-ENTMCNC: 34.6 GM/DL — SIGNIFICANT CHANGE UP (ref 32–36)
MCV RBC AUTO: 84.4 FL — SIGNIFICANT CHANGE UP (ref 80–100)
MONOCYTES # BLD AUTO: 0.45 K/UL — SIGNIFICANT CHANGE UP (ref 0–0.9)
MONOCYTES NFR BLD AUTO: 6.4 % — SIGNIFICANT CHANGE UP (ref 2–14)
NEUTROPHILS # BLD AUTO: 4.61 K/UL — SIGNIFICANT CHANGE UP (ref 1.8–7.4)
NEUTROPHILS NFR BLD AUTO: 65.4 % — SIGNIFICANT CHANGE UP (ref 43–77)
PLATELET # BLD AUTO: 224 K/UL — SIGNIFICANT CHANGE UP (ref 150–400)
POTASSIUM SERPL-MCNC: 4.2 MMOL/L — SIGNIFICANT CHANGE UP (ref 3.5–5.3)
POTASSIUM SERPL-SCNC: 4.2 MMOL/L — SIGNIFICANT CHANGE UP (ref 3.5–5.3)
PROTHROM AB SERPL-ACNC: 12.5 SEC — SIGNIFICANT CHANGE UP (ref 10.6–13.6)
RBC # BLD: 5.58 M/UL — SIGNIFICANT CHANGE UP (ref 4.2–5.8)
RBC # FLD: 12.3 % — SIGNIFICANT CHANGE UP (ref 10.3–14.5)
SODIUM SERPL-SCNC: 138 MMOL/L — SIGNIFICANT CHANGE UP (ref 135–145)
WBC # BLD: 7.04 K/UL — SIGNIFICANT CHANGE UP (ref 3.8–10.5)
WBC # FLD AUTO: 7.04 K/UL — SIGNIFICANT CHANGE UP (ref 3.8–10.5)

## 2021-04-21 PROCEDURE — 85610 PROTHROMBIN TIME: CPT

## 2021-04-21 PROCEDURE — 80048 BASIC METABOLIC PNL TOTAL CA: CPT

## 2021-04-21 PROCEDURE — 85730 THROMBOPLASTIN TIME PARTIAL: CPT

## 2021-04-21 PROCEDURE — 36415 COLL VENOUS BLD VENIPUNCTURE: CPT

## 2021-04-21 PROCEDURE — 93005 ELECTROCARDIOGRAM TRACING: CPT

## 2021-04-21 PROCEDURE — G0463: CPT | Mod: 25

## 2021-04-21 PROCEDURE — 86901 BLOOD TYPING SEROLOGIC RH(D): CPT

## 2021-04-21 PROCEDURE — 86900 BLOOD TYPING SEROLOGIC ABO: CPT

## 2021-04-21 PROCEDURE — 83036 HEMOGLOBIN GLYCOSYLATED A1C: CPT

## 2021-04-21 PROCEDURE — 93010 ELECTROCARDIOGRAM REPORT: CPT

## 2021-04-21 PROCEDURE — 85025 COMPLETE CBC W/AUTO DIFF WBC: CPT

## 2021-04-21 PROCEDURE — 86850 RBC ANTIBODY SCREEN: CPT

## 2021-04-21 NOTE — H&P PST ADULT - HISTORY OF PRESENT ILLNESS
45 year old male with diverticulitis s/p colectomy colostomy; colostomy pinkish patent denies abdominal pain; he presents to PST for planned laparoscopic reversal of colonoscopy

## 2021-04-21 NOTE — H&P PST ADULT - HEALTH CARE MAINTENANCE
flu vaccine not current  covid vaccine  [place] 14 days Pt denies  travel internationally for the past 21 days

## 2021-04-21 NOTE — H&P PST ADULT - ASSESSMENT
45 year old male with diverticulitis s/p colectomy colostomy; colostomy pinkish patent denies abdominal pain; he presents to PST for planned laparoscopic reversal of colonoscopy     Plan:  1. PST instructions given ; NPO status instructions to be given by ASU   2. Pt instructed to take following meds with sip of water : none   3. Pt instructed to take routine evening medications unless indicated   4. Stop NSAIDS ( Aspirin Alev Motrin Mobic Diclofenac), herbal supplements , MVI , Vitamin fish oil 7 days prior to surgery  unless   directed by surgeon or cardiologist;   5. Medical Optimization  with Dr Maxx Mallory   6. EZ wash instructions given   7. Labs EKG  as per surgeon request   8. Pt instructed to self quarantine after Covid test   9. Covid Testing scheduled Pt notified and aware  10. Pt denies covid symptoms shortness of breath fever cough       CAPRINI SCORE [CLOT]    AGE RELATED RISK FACTORS                                                       MOBILITY RELATED FACTORS  [x ] Age 41-60 years                                            (1 Point)                  [ ] Bed rest                                                        (1 Point)  [ ] Age: 61-74 years                                           (2 Points)                 [ ] Plaster cast                                                   (2 Points)  [ ] Age= 75 years                                              (3 Points)                 [ ] Bed bound for more than 72 hours                 (2 Points)    DISEASE RELATED RISK FACTORS                                               GENDER SPECIFIC FACTORS  [ ] Edema in the lower extremities                       (1 Point)                  [ ] Pregnancy                                                     (1 Point)  [ ] Varicose veins                                               (1 Point)                  [ ] Post-partum < 6 weeks                                   (1 Point)             [ x] BMI > 25 Kg/m2                                            (1 Point)                  [ ] Hormonal therapy  or oral contraception          (1 Point)                 [ ] Sepsis (in the previous month)                        (1 Point)                  [ ] History of pregnancy complications                 (1 point)  [ ] Pneumonia or serious lung disease                                               [ ] Unexplained or recurrent                     (1 Point)           (in the previous month)                               (1 Point)  [ ] Abnormal pulmonary function test                     (1 Point)                 SURGERY RELATED RISK FACTORS  [ ] Acute myocardial infarction                              (1 Point)                 [ ]  Section                                             (1 Point)  [ ] Congestive heart failure (in the previous month)  (1 Point)               [ ] Minor surgery                                                  (1 Point)   [ x] Inflammatory bowel disease                             (1 Point)                 [ ] Arthroscopic surgery                                        (2 Points)  [ ] Central venous access                                      (2 Points)                [ x] General surgery lasting more than 45 minutes   (2 Points)       [ ] Stroke (in the previous month)                          (5 Points)               [ ] Elective arthroplasty                                         (5 Points)            ( ) presents and past malignancy                           (2 points)                                                                                                         HEMATOLOGY RELATED FACTORS                                                 TRAUMA RELATED RISK FACTORS  [ ] Prior episodes of VTE                                     (3 Points)                 [ ] Fracture of the hip, pelvis, or leg                       (5 Points)  [ ] Positive family history for VTE                         (3 Points)                 [ ] Acute spinal cord injury (in the previous month)  (5 Points)  [ ] Prothrombin 77112 A                                     (3 Points)                 [ ] Paralysis  (less than 1 month)                             (5 Points)  [ ] Factor V Leiden                                             (3 Points)                  [ ] Multiple Trauma within 1 month                        (5 Points)  [ ] Lupus anticoagulants                                     (3 Points)                                                           [ ] Anticardiolipin antibodies                               (3 Points)                                                       [ ] High homocysteine in the blood                      (3 Points)                                             [ ] Other congenital or acquired thrombophilia      (3 Points)                                                [ ] Heparin induced thrombocytopenia                  (3 Points)                                          Total Score [   5       ]

## 2021-04-21 NOTE — H&P PST ADULT - NSANTHOSAYNRD_GEN_A_CORE
No. FOREST screening performed.  STOP BANG Legend: 0-2 = LOW Risk; 3-4 = INTERMEDIATE Risk; 5-8 = HIGH Risk

## 2021-04-22 DIAGNOSIS — Z43.3 ENCOUNTER FOR ATTENTION TO COLOSTOMY: ICD-10-CM

## 2021-04-22 DIAGNOSIS — Z01.818 ENCOUNTER FOR OTHER PREPROCEDURAL EXAMINATION: ICD-10-CM

## 2021-04-24 ENCOUNTER — LABORATORY RESULT (OUTPATIENT)
Age: 46
End: 2021-04-24

## 2021-04-24 ENCOUNTER — APPOINTMENT (OUTPATIENT)
Dept: DISASTER EMERGENCY | Facility: CLINIC | Age: 46
End: 2021-04-24

## 2021-04-25 RX ORDER — SODIUM CHLORIDE 9 MG/ML
3 INJECTION INTRAMUSCULAR; INTRAVENOUS; SUBCUTANEOUS EVERY 8 HOURS
Refills: 0 | Status: DISCONTINUED | OUTPATIENT
Start: 2021-04-27 | End: 2021-04-30

## 2021-04-26 RX ORDER — SODIUM CHLORIDE 9 MG/ML
1000 INJECTION, SOLUTION INTRAVENOUS
Refills: 0 | Status: DISCONTINUED | OUTPATIENT
Start: 2021-04-27 | End: 2021-04-27

## 2021-04-26 RX ORDER — HYDROMORPHONE HYDROCHLORIDE 2 MG/ML
0.5 INJECTION INTRAMUSCULAR; INTRAVENOUS; SUBCUTANEOUS
Refills: 0 | Status: DISCONTINUED | OUTPATIENT
Start: 2021-04-27 | End: 2021-04-27

## 2021-04-26 RX ORDER — ONDANSETRON 8 MG/1
4 TABLET, FILM COATED ORAL ONCE
Refills: 0 | Status: DISCONTINUED | OUTPATIENT
Start: 2021-04-27 | End: 2021-04-27

## 2021-04-27 ENCOUNTER — APPOINTMENT (OUTPATIENT)
Dept: COLORECTAL SURGERY | Facility: HOSPITAL | Age: 46
End: 2021-04-27

## 2021-04-27 ENCOUNTER — INPATIENT (INPATIENT)
Facility: HOSPITAL | Age: 46
LOS: 2 days | Discharge: HOME | DRG: 346 | End: 2021-04-30
Attending: INTERNAL MEDICINE | Admitting: COLON & RECTAL SURGERY
Payer: COMMERCIAL

## 2021-04-27 ENCOUNTER — RESULT REVIEW (OUTPATIENT)
Age: 46
End: 2021-04-27

## 2021-04-27 VITALS
WEIGHT: 220.02 LBS | SYSTOLIC BLOOD PRESSURE: 135 MMHG | HEIGHT: 72 IN | RESPIRATION RATE: 16 BRPM | DIASTOLIC BLOOD PRESSURE: 91 MMHG | HEART RATE: 70 BPM | TEMPERATURE: 99 F

## 2021-04-27 DIAGNOSIS — Z90.49 ACQUIRED ABSENCE OF OTHER SPECIFIED PARTS OF DIGESTIVE TRACT: Chronic | ICD-10-CM

## 2021-04-27 DIAGNOSIS — Z43.3 ENCOUNTER FOR ATTENTION TO COLOSTOMY: ICD-10-CM

## 2021-04-27 PROCEDURE — 88307 TISSUE EXAM BY PATHOLOGIST: CPT

## 2021-04-27 PROCEDURE — 49585: CPT | Mod: 59

## 2021-04-27 PROCEDURE — C1889: CPT

## 2021-04-27 PROCEDURE — 88304 TISSUE EXAM BY PATHOLOGIST: CPT | Mod: 26

## 2021-04-27 PROCEDURE — 88304 TISSUE EXAM BY PATHOLOGIST: CPT

## 2021-04-27 PROCEDURE — 99253 IP/OBS CNSLTJ NEW/EST LOW 45: CPT

## 2021-04-27 PROCEDURE — 36415 COLL VENOUS BLD VENIPUNCTURE: CPT

## 2021-04-27 PROCEDURE — 88307 TISSUE EXAM BY PATHOLOGIST: CPT | Mod: 26

## 2021-04-27 PROCEDURE — 83735 ASSAY OF MAGNESIUM: CPT

## 2021-04-27 PROCEDURE — 45300 PROCTOSIGMOIDOSCOPY DX: CPT

## 2021-04-27 PROCEDURE — 82962 GLUCOSE BLOOD TEST: CPT

## 2021-04-27 PROCEDURE — 84100 ASSAY OF PHOSPHORUS: CPT

## 2021-04-27 PROCEDURE — 44227 LAP CLOSE ENTEROSTOMY: CPT

## 2021-04-27 PROCEDURE — 86769 SARS-COV-2 COVID-19 ANTIBODY: CPT

## 2021-04-27 PROCEDURE — 80048 BASIC METABOLIC PNL TOTAL CA: CPT

## 2021-04-27 PROCEDURE — 85027 COMPLETE CBC AUTOMATED: CPT

## 2021-04-27 PROCEDURE — 85025 COMPLETE CBC W/AUTO DIFF WBC: CPT

## 2021-04-27 RX ORDER — ALVIMOPAN 12 MG/1
12 CAPSULE ORAL
Refills: 0 | Status: DISCONTINUED | OUTPATIENT
Start: 2021-04-28 | End: 2021-04-30

## 2021-04-27 RX ORDER — CEFOTETAN DISODIUM 1 G
2 VIAL (EA) INJECTION ONCE
Refills: 0 | Status: DISCONTINUED | OUTPATIENT
Start: 2021-04-27 | End: 2021-04-27

## 2021-04-27 RX ORDER — TOBRAMYCIN 0.3 %
1 DROPS OPHTHALMIC (EYE) EVERY 4 HOURS
Refills: 0 | Status: COMPLETED | OUTPATIENT
Start: 2021-04-27 | End: 2021-04-28

## 2021-04-27 RX ORDER — ONDANSETRON 8 MG/1
4 TABLET, FILM COATED ORAL EVERY 6 HOURS
Refills: 0 | Status: DISCONTINUED | OUTPATIENT
Start: 2021-04-27 | End: 2021-04-30

## 2021-04-27 RX ORDER — OXYCODONE HYDROCHLORIDE 5 MG/1
5 TABLET ORAL EVERY 6 HOURS
Refills: 0 | Status: DISCONTINUED | OUTPATIENT
Start: 2021-04-27 | End: 2021-04-30

## 2021-04-27 RX ORDER — SODIUM CHLORIDE 9 MG/ML
3 INJECTION INTRAMUSCULAR; INTRAVENOUS; SUBCUTANEOUS EVERY 8 HOURS
Refills: 0 | Status: DISCONTINUED | OUTPATIENT
Start: 2021-04-27 | End: 2021-04-30

## 2021-04-27 RX ORDER — FAMOTIDINE 10 MG/ML
20 INJECTION INTRAVENOUS EVERY 12 HOURS
Refills: 0 | Status: DISCONTINUED | OUTPATIENT
Start: 2021-04-27 | End: 2021-04-30

## 2021-04-27 RX ORDER — ALVIMOPAN 12 MG/1
12 CAPSULE ORAL ONCE
Refills: 0 | Status: COMPLETED | OUTPATIENT
Start: 2021-04-27 | End: 2021-04-27

## 2021-04-27 RX ORDER — KETOROLAC TROMETHAMINE 30 MG/ML
15 SYRINGE (ML) INJECTION EVERY 6 HOURS
Refills: 0 | Status: DISCONTINUED | OUTPATIENT
Start: 2021-04-27 | End: 2021-04-30

## 2021-04-27 RX ORDER — ENOXAPARIN SODIUM 100 MG/ML
40 INJECTION SUBCUTANEOUS DAILY
Refills: 0 | Status: DISCONTINUED | OUTPATIENT
Start: 2021-04-27 | End: 2021-04-30

## 2021-04-27 RX ORDER — HYDROMORPHONE HYDROCHLORIDE 2 MG/ML
1 INJECTION INTRAMUSCULAR; INTRAVENOUS; SUBCUTANEOUS EVERY 4 HOURS
Refills: 0 | Status: DISCONTINUED | OUTPATIENT
Start: 2021-04-27 | End: 2021-04-30

## 2021-04-27 RX ORDER — HEPARIN SODIUM 5000 [USP'U]/ML
5000 INJECTION INTRAVENOUS; SUBCUTANEOUS ONCE
Refills: 0 | Status: COMPLETED | OUTPATIENT
Start: 2021-04-27 | End: 2021-04-27

## 2021-04-27 RX ORDER — SODIUM CHLORIDE 9 MG/ML
1000 INJECTION INTRAMUSCULAR; INTRAVENOUS; SUBCUTANEOUS
Refills: 0 | Status: DISCONTINUED | OUTPATIENT
Start: 2021-04-27 | End: 2021-04-29

## 2021-04-27 RX ADMIN — HYDROMORPHONE HYDROCHLORIDE 0.5 MILLIGRAM(S): 2 INJECTION INTRAMUSCULAR; INTRAVENOUS; SUBCUTANEOUS at 15:10

## 2021-04-27 RX ADMIN — Medication 15 MILLIGRAM(S): at 17:25

## 2021-04-27 RX ADMIN — SODIUM CHLORIDE 3 MILLILITER(S): 9 INJECTION INTRAMUSCULAR; INTRAVENOUS; SUBCUTANEOUS at 22:29

## 2021-04-27 RX ADMIN — Medication 1 DROP(S): at 17:47

## 2021-04-27 RX ADMIN — ENOXAPARIN SODIUM 40 MILLIGRAM(S): 100 INJECTION SUBCUTANEOUS at 22:33

## 2021-04-27 RX ADMIN — SODIUM CHLORIDE 75 MILLILITER(S): 9 INJECTION, SOLUTION INTRAVENOUS at 15:03

## 2021-04-27 RX ADMIN — Medication 15 MILLIGRAM(S): at 17:24

## 2021-04-27 RX ADMIN — ALVIMOPAN 12 MILLIGRAM(S): 12 CAPSULE ORAL at 07:55

## 2021-04-27 RX ADMIN — HEPARIN SODIUM 5000 UNIT(S): 5000 INJECTION INTRAVENOUS; SUBCUTANEOUS at 07:57

## 2021-04-27 RX ADMIN — Medication 1 DROP(S): at 22:33

## 2021-04-27 RX ADMIN — HYDROMORPHONE HYDROCHLORIDE 0.5 MILLIGRAM(S): 2 INJECTION INTRAMUSCULAR; INTRAVENOUS; SUBCUTANEOUS at 14:55

## 2021-04-27 NOTE — CONSULT NOTE ADULT - SUBJECTIVE AND OBJECTIVE BOX
PCP:  Dr. Maxx Mallory    CHIEF COMPLAINT: reversal of colodtomy    HISTORY OF THE PRESENT ILLNESS: this is a 44 yo male with pmh kidney stones and diverticulitis who 1 year ago had diverticulitis with perforation, with exxtensive acute serositis and large areas of acute pericolonic abscesses, sepsis with subsequent  colectomy with colostomy and is now in PACU S/P reversal. Pt is seen in PACU, still sedated from anesthesia. in NAD, we are consulted for Medical management    PAST MEDICAL HISTORY:as above    PAST SURGICAL HISTORY: colectomy/colostomy    FAMILY HISTORY:   both parents with diverticulitis, father h/o non hodgkins lymphoma    SOCIAL HISTORY:  no smoking, no alcohol, no drugs    ALLERGIES: NKDA    HOME MEDS: no home meds    REVIEW OF SYSTEMS:   All 10 systems reviewed in detailed and found to be negative with the exception of what has already been described above    MEDICATIONS  (STANDING):  lactated ringers. 1000 milliLiter(s) (75 mL/Hr) IV Continuous <Continuous>    MEDICATIONS  (PRN):  HYDROmorphone  Injectable 0.5 milliGRAM(s) IV Push every 10 minutes PRN Moderate Pain (4 - 6)  ondansetron Injectable 4 milliGRAM(s) IV Push once PRN Nausea and/or Vomiting      VITALS:  T(F): 98.3 (04-27-21 @ 14:45), Max: 98.8 (04-27-21 @ 08:02)  HR: 80 (04-27-21 @ 15:15) (63 - 80)  BP: 130/68 (04-27-21 @ 15:15) (127/70 - 139/81)  RR: 13 (04-27-21 @ 15:15) (13 - 16)  SpO2: 100% (04-27-21 @ 15:15) (100% - 100%)  Wt(kg): --    I&O's Summary    27 Apr 2021 07:01  -  27 Apr 2021 15:20  --------------------------------------------------------  IN: 2000 mL / OUT: 100 mL / NET: 1900 mL        CAPILLARY BLOOD GLUCOSE      POCT Blood Glucose.: 105 mg/dL (27 Apr 2021 14:49)  POCT Blood Glucose.: 102 mg/dL (27 Apr 2021 11:15)  POCT Blood Glucose.: 88 mg/dL (27 Apr 2021 07:40)    PHYSICAL EXAM:    GENERAL: Comfortable, no acute distress   HEAD:  Normocephalic, atraumatic  EYES: EOMI, PERRLA  HEENT: Moist mucous membranes  NECK: Supple, No JVD  NERVOUS SYSTEM:  Alert & Oriented X3, Motor Strength 5/5 B/L upper and lower extremities  CHEST/LUNG: Clear to auscultation bilaterally  HEART: Regular rate and rhythm  ABDOMEN: Soft, appropriate post op tenderness , Nondistended, Bowel sounds hypoactive, + abd dsg with lap site c/d/i  GENITOURINARY: bangura  EXTREMITIES:   No clubbing, cyanosis, or edema  MUSCULOSKELETAL- No muscle tenderness, no joint tenderness  SKIN-no rash            LABS: pending          IMPRESSION: 44yo M with above pmh. a/w:  # reversal of colostomy  pain control  IVF's  bangura  Monitor I& O  Monitor Cbc, BMP  BGM per CRS protocol  Cont Abxs  NPO  Enterag    #VTE prophylaxis  hep sq  Venodynes  Amb    thank you for the consult, will follow with you

## 2021-04-27 NOTE — CONSULT NOTE ADULT - ATTENDING COMMENTS
Patient is seen and examined in PACU at bedside with NP Nuvia Thompson. Still sleepy after anesthesia. S/p reversal of colostomy, abd dressing dry. Agree with above assessment and plan.

## 2021-04-27 NOTE — BRIEF OPERATIVE NOTE - OPERATION/FINDINGS
Colostomy reversal  dx lap  lysis of adhesions  umbilical hernia repair  splenic flexure mobilization

## 2021-04-28 LAB
ANION GAP SERPL CALC-SCNC: 5 MMOL/L — SIGNIFICANT CHANGE UP (ref 5–17)
BASOPHILS # BLD AUTO: 0.01 K/UL — SIGNIFICANT CHANGE UP (ref 0–0.2)
BASOPHILS NFR BLD AUTO: 0.1 % — SIGNIFICANT CHANGE UP (ref 0–2)
BUN SERPL-MCNC: 18 MG/DL — SIGNIFICANT CHANGE UP (ref 7–23)
CALCIUM SERPL-MCNC: 8.3 MG/DL — LOW (ref 8.5–10.1)
CHLORIDE SERPL-SCNC: 107 MMOL/L — SIGNIFICANT CHANGE UP (ref 96–108)
CO2 SERPL-SCNC: 26 MMOL/L — SIGNIFICANT CHANGE UP (ref 22–31)
COVID-19 SPIKE DOMAIN AB INTERP: NEGATIVE — SIGNIFICANT CHANGE UP
COVID-19 SPIKE DOMAIN ANTIBODY RESULT: 0.4 U/ML — SIGNIFICANT CHANGE UP
CREAT SERPL-MCNC: 1.22 MG/DL — SIGNIFICANT CHANGE UP (ref 0.5–1.3)
EOSINOPHIL # BLD AUTO: 0 K/UL — SIGNIFICANT CHANGE UP (ref 0–0.5)
EOSINOPHIL NFR BLD AUTO: 0 % — SIGNIFICANT CHANGE UP (ref 0–6)
GLUCOSE SERPL-MCNC: 98 MG/DL — SIGNIFICANT CHANGE UP (ref 70–99)
HCT VFR BLD CALC: 40.7 % — SIGNIFICANT CHANGE UP (ref 39–50)
HGB BLD-MCNC: 13.8 G/DL — SIGNIFICANT CHANGE UP (ref 13–17)
IMM GRANULOCYTES NFR BLD AUTO: 0.4 % — SIGNIFICANT CHANGE UP (ref 0–1.5)
LYMPHOCYTES # BLD AUTO: 1.25 K/UL — SIGNIFICANT CHANGE UP (ref 1–3.3)
LYMPHOCYTES # BLD AUTO: 15.6 % — SIGNIFICANT CHANGE UP (ref 13–44)
MCHC RBC-ENTMCNC: 28.9 PG — SIGNIFICANT CHANGE UP (ref 27–34)
MCHC RBC-ENTMCNC: 33.9 GM/DL — SIGNIFICANT CHANGE UP (ref 32–36)
MCV RBC AUTO: 85.3 FL — SIGNIFICANT CHANGE UP (ref 80–100)
MONOCYTES # BLD AUTO: 0.76 K/UL — SIGNIFICANT CHANGE UP (ref 0–0.9)
MONOCYTES NFR BLD AUTO: 9.5 % — SIGNIFICANT CHANGE UP (ref 2–14)
NEUTROPHILS # BLD AUTO: 5.96 K/UL — SIGNIFICANT CHANGE UP (ref 1.8–7.4)
NEUTROPHILS NFR BLD AUTO: 74.4 % — SIGNIFICANT CHANGE UP (ref 43–77)
PLATELET # BLD AUTO: 202 K/UL — SIGNIFICANT CHANGE UP (ref 150–400)
POTASSIUM SERPL-MCNC: 3.8 MMOL/L — SIGNIFICANT CHANGE UP (ref 3.5–5.3)
POTASSIUM SERPL-SCNC: 3.8 MMOL/L — SIGNIFICANT CHANGE UP (ref 3.5–5.3)
RBC # BLD: 4.77 M/UL — SIGNIFICANT CHANGE UP (ref 4.2–5.8)
RBC # FLD: 12.6 % — SIGNIFICANT CHANGE UP (ref 10.3–14.5)
SARS-COV-2 IGG+IGM SERPL QL IA: 0.4 U/ML — SIGNIFICANT CHANGE UP
SARS-COV-2 IGG+IGM SERPL QL IA: NEGATIVE — SIGNIFICANT CHANGE UP
SODIUM SERPL-SCNC: 138 MMOL/L — SIGNIFICANT CHANGE UP (ref 135–145)
WBC # BLD: 8.01 K/UL — SIGNIFICANT CHANGE UP (ref 3.8–10.5)
WBC # FLD AUTO: 8.01 K/UL — SIGNIFICANT CHANGE UP (ref 3.8–10.5)

## 2021-04-28 PROCEDURE — 99232 SBSQ HOSP IP/OBS MODERATE 35: CPT

## 2021-04-28 RX ORDER — ACETAMINOPHEN 500 MG
1000 TABLET ORAL ONCE
Refills: 0 | Status: COMPLETED | OUTPATIENT
Start: 2021-04-28 | End: 2021-04-28

## 2021-04-28 RX ADMIN — SODIUM CHLORIDE 3 MILLILITER(S): 9 INJECTION INTRAMUSCULAR; INTRAVENOUS; SUBCUTANEOUS at 21:55

## 2021-04-28 RX ADMIN — Medication 15 MILLIGRAM(S): at 11:02

## 2021-04-28 RX ADMIN — Medication 15 MILLIGRAM(S): at 17:11

## 2021-04-28 RX ADMIN — Medication 15 MILLIGRAM(S): at 23:08

## 2021-04-28 RX ADMIN — Medication 15 MILLIGRAM(S): at 00:01

## 2021-04-28 RX ADMIN — Medication 15 MILLIGRAM(S): at 23:07

## 2021-04-28 RX ADMIN — Medication 1000 MILLIGRAM(S): at 13:40

## 2021-04-28 RX ADMIN — Medication 15 MILLIGRAM(S): at 05:53

## 2021-04-28 RX ADMIN — Medication 1 DROP(S): at 02:19

## 2021-04-28 RX ADMIN — ALVIMOPAN 12 MILLIGRAM(S): 12 CAPSULE ORAL at 09:28

## 2021-04-28 RX ADMIN — Medication 400 MILLIGRAM(S): at 13:08

## 2021-04-28 RX ADMIN — ALVIMOPAN 12 MILLIGRAM(S): 12 CAPSULE ORAL at 23:07

## 2021-04-28 RX ADMIN — SODIUM CHLORIDE 3 MILLILITER(S): 9 INJECTION INTRAMUSCULAR; INTRAVENOUS; SUBCUTANEOUS at 11:12

## 2021-04-28 RX ADMIN — SODIUM CHLORIDE 3 MILLILITER(S): 9 INJECTION INTRAMUSCULAR; INTRAVENOUS; SUBCUTANEOUS at 05:48

## 2021-04-28 RX ADMIN — ENOXAPARIN SODIUM 40 MILLIGRAM(S): 100 INJECTION SUBCUTANEOUS at 09:28

## 2021-04-28 NOTE — PROGRESS NOTE ADULT - SUBJECTIVE AND OBJECTIVE BOX
Patient seen and examined at the bedside with surgery team. Pain is well controlled.  No acute issues overnight. Tolerated surgery well. No fevers, chills, nausea or vomiting.     Vitals:  T(C): 36.4 (04-28 @ 05:02), Max: 37.2 (04-28 @ 00:20)  HR: 60 (04-28 @ 05:02) (60 - 107)  BP: 99/53 (04-28 @ 05:02) (99/53 - 139/81)  RR: 18 (04-28 @ 05:02) (12 - 18)  SpO2: 94% (04-28 @ 05:02) (94% - 100%)    04-27 @ 07:01  -  04-28 @ 07:00  --------------------------------------------------------  IN:    Other (mL): 2000 mL    sodium chloride 0.9%: 975 mL  Total IN: 2975 mL    OUT:    Estimated Blood Loss (mL): 100 mL    Indwelling Catheter - Urethral (mL): 1220 mL    Other (mL): 800 mL  Total OUT: 2120 mL    Total NET: 855 mL          Physical Exam:  General: AAOx3, Well developed, NAD  Chest: Normal respiratory effort  Heart: RRR  Abdomen: Soft, NTND, no masses  Neuro/Psych: No localized deficits. Normal spech, normal tone  Skin: Normal, no rashes, no lesions noted.   Extremities: Warm, well perfused, no edema, Pulses intact        Current Inpatient Medications:  alvimopan 12 milliGRAM(s) Oral two times a day  enoxaparin Injectable 40 milliGRAM(s) SubCutaneous daily  famotidine Injectable 20 milliGRAM(s) IV Push every 12 hours PRN  HYDROmorphone  Injectable 1 milliGRAM(s) IV Push every 4 hours PRN  ketorolac   Injectable 15 milliGRAM(s) IV Push every 6 hours  ondansetron Injectable 4 milliGRAM(s) IV Push every 6 hours PRN  oxyCODONE    IR 5 milliGRAM(s) Oral every 6 hours PRN  sodium chloride 0.9% lock flush 3 milliLiter(s) IV Push every 8 hours  sodium chloride 0.9% lock flush 3 milliLiter(s) IV Push every 8 hours  sodium chloride 0.9%. 1000 milliLiter(s) (100 mL/Hr) IV Continuous <Continuous>

## 2021-04-28 NOTE — PROGRESS NOTE ADULT - SUBJECTIVE AND OBJECTIVE BOX
PCP:  Dr. Maxx Mallory    CHIEF COMPLAINT: reversal of colodtomy    HISTORY OF THE PRESENT ILLNESS: this is a 44 yo male with pmh kidney stones and diverticulitis who 1 year ago had diverticulitis with perforation, with exxtensive acute serositis and large areas of acute pericolonic abscesses, sepsis with subsequent  colectomy with colostomy and is now in PACU S/P reversal. Pt is seen in PACU, still sedated from anesthesia. in NAD, we are consulted for Medical management    PAST MEDICAL HISTORY:as above    PAST SURGICAL HISTORY: colectomy/colostomy    FAMILY HISTORY:   both parents with diverticulitis, father h/o non hodgkins lymphoma    SOCIAL HISTORY:  no smoking, no alcohol, no drugs    ALLERGIES: NKDA    HOME MEDS: no home meds    4/28/21- up and ambulating, no flatus, no BM today yet    REVIEW OF SYSTEMS:   All 10 systems reviewed in detailed and found to be negative with the exception of what has already been described above    Vital Signs Last 24 Hrs  T(C): 36.8 (28 Apr 2021 08:58), Max: 37.2 (28 Apr 2021 00:20)  T(F): 98.2 (28 Apr 2021 08:58), Max: 98.9 (28 Apr 2021 00:20)  HR: 86 (28 Apr 2021 08:58) (60 - 107)  BP: 117/62 (28 Apr 2021 08:58) (99/53 - 139/81)  BP(mean): --  RR: 18 (28 Apr 2021 08:58) (12 - 18)  SpO2: 100% (28 Apr 2021 08:58) (94% - 100%)    PHYSICAL EXAM:  GENERAL: Comfortable, no acute distress   HEAD:  Normocephalic, atraumatic  EYES: EOMI, PERRLA  HEENT: Moist mucous membranes  NECK: Supple, No JVD  NERVOUS SYSTEM:  Alert & Oriented X3, Motor Strength 5/5 B/L upper and lower extremities  CHEST/LUNG: Clear to auscultation bilaterally  HEART: Regular rate and rhythm  ABDOMEN: Soft, appropriate post op tenderness , Nondistended, Bowel sounds hypoactive, + abd dsg with lap site c/d/i  GENITOURINARY: bangura removed, voiding  EXTREMITIES:   No clubbing, cyanosis, or edema  MUSCULOSKELETAL- No muscle tenderness, no joint tenderness  SKIN-no rash    LABS:                         13.8   8.01  )-----------( 202      ( 28 Apr 2021 07:12 )             40.7     28 Apr 2021 07:12    138    |  107    |  18     ----------------------------<  98     3.8     |  26     |  1.22     Ca    8.3        28 Apr 2021 07:12    CAPILLARY BLOOD GLUCOSE  POCT Blood Glucose.: 93 mg/dL (28 Apr 2021 11:53)  POCT Blood Glucose.: 108 mg/dL (28 Apr 2021 05:52)  POCT Blood Glucose.: 123 mg/dL (27 Apr 2021 22:33)  POCT Blood Glucose.: 105 mg/dL (27 Apr 2021 14:49)    MEDICATIONS  (STANDING):  alvimopan 12 milliGRAM(s) Oral two times a day  enoxaparin Injectable 40 milliGRAM(s) SubCutaneous daily  ketorolac   Injectable 15 milliGRAM(s) IV Push every 6 hours  sodium chloride 0.9% lock flush 3 milliLiter(s) IV Push every 8 hours  sodium chloride 0.9% lock flush 3 milliLiter(s) IV Push every 8 hours  sodium chloride 0.9%. 1000 milliLiter(s) (100 mL/Hr) IV Continuous <Continuous>    MEDICATIONS  (PRN):  famotidine Injectable 20 milliGRAM(s) IV Push every 12 hours PRN Dyspepsia  HYDROmorphone  Injectable 1 milliGRAM(s) IV Push every 4 hours PRN Severe Pain (7 - 10)  ondansetron Injectable 4 milliGRAM(s) IV Push every 6 hours PRN Nausea  oxyCODONE    IR 5 milliGRAM(s) Oral every 6 hours PRN Moderate Pain (4 - 6)    IMPRESSION: 46yo M with above pmh. a/w:  #reversal of colostomy  CRS f/u appreciated  Bangura removed and voiding  OOB and ambulating  No flatus, no BM yet- awaiting return of bowel function postop  Incentive spirometry    #VTE prophylaxis  hep sq  Venodynes  Amb    #Dispo- likely home in 1-2 days when bowel function returns postop. D/w pt

## 2021-04-29 ENCOUNTER — TRANSCRIPTION ENCOUNTER (OUTPATIENT)
Age: 46
End: 2021-04-29

## 2021-04-29 LAB
ANION GAP SERPL CALC-SCNC: 3 MMOL/L — LOW (ref 5–17)
BUN SERPL-MCNC: 15 MG/DL — SIGNIFICANT CHANGE UP (ref 7–23)
CALCIUM SERPL-MCNC: 9.2 MG/DL — SIGNIFICANT CHANGE UP (ref 8.5–10.1)
CHLORIDE SERPL-SCNC: 109 MMOL/L — HIGH (ref 96–108)
CO2 SERPL-SCNC: 29 MMOL/L — SIGNIFICANT CHANGE UP (ref 22–31)
CREAT SERPL-MCNC: 1.02 MG/DL — SIGNIFICANT CHANGE UP (ref 0.5–1.3)
GLUCOSE SERPL-MCNC: 93 MG/DL — SIGNIFICANT CHANGE UP (ref 70–99)
HCT VFR BLD CALC: 38.2 % — LOW (ref 39–50)
HGB BLD-MCNC: 13 G/DL — SIGNIFICANT CHANGE UP (ref 13–17)
MAGNESIUM SERPL-MCNC: 2.4 MG/DL — SIGNIFICANT CHANGE UP (ref 1.6–2.6)
MCHC RBC-ENTMCNC: 29.7 PG — SIGNIFICANT CHANGE UP (ref 27–34)
MCHC RBC-ENTMCNC: 34 GM/DL — SIGNIFICANT CHANGE UP (ref 32–36)
MCV RBC AUTO: 87.2 FL — SIGNIFICANT CHANGE UP (ref 80–100)
PHOSPHATE SERPL-MCNC: 1.7 MG/DL — LOW (ref 2.5–4.5)
PLATELET # BLD AUTO: 201 K/UL — SIGNIFICANT CHANGE UP (ref 150–400)
POTASSIUM SERPL-MCNC: 3.8 MMOL/L — SIGNIFICANT CHANGE UP (ref 3.5–5.3)
POTASSIUM SERPL-SCNC: 3.8 MMOL/L — SIGNIFICANT CHANGE UP (ref 3.5–5.3)
RBC # BLD: 4.38 M/UL — SIGNIFICANT CHANGE UP (ref 4.2–5.8)
RBC # FLD: 12.7 % — SIGNIFICANT CHANGE UP (ref 10.3–14.5)
SODIUM SERPL-SCNC: 141 MMOL/L — SIGNIFICANT CHANGE UP (ref 135–145)
WBC # BLD: 7.91 K/UL — SIGNIFICANT CHANGE UP (ref 3.8–10.5)
WBC # FLD AUTO: 7.91 K/UL — SIGNIFICANT CHANGE UP (ref 3.8–10.5)

## 2021-04-29 PROCEDURE — 99232 SBSQ HOSP IP/OBS MODERATE 35: CPT

## 2021-04-29 RX ORDER — OXYCODONE AND ACETAMINOPHEN 5; 325 MG/1; MG/1
1 TABLET ORAL
Qty: 20 | Refills: 0
Start: 2021-04-29

## 2021-04-29 RX ORDER — SODIUM,POTASSIUM PHOSPHATES 278-250MG
1 POWDER IN PACKET (EA) ORAL ONCE
Refills: 0 | Status: COMPLETED | OUTPATIENT
Start: 2021-04-29 | End: 2021-04-29

## 2021-04-29 RX ADMIN — SODIUM CHLORIDE 3 MILLILITER(S): 9 INJECTION INTRAMUSCULAR; INTRAVENOUS; SUBCUTANEOUS at 21:25

## 2021-04-29 RX ADMIN — SODIUM CHLORIDE 3 MILLILITER(S): 9 INJECTION INTRAMUSCULAR; INTRAVENOUS; SUBCUTANEOUS at 06:15

## 2021-04-29 RX ADMIN — ALVIMOPAN 12 MILLIGRAM(S): 12 CAPSULE ORAL at 21:26

## 2021-04-29 RX ADMIN — ENOXAPARIN SODIUM 40 MILLIGRAM(S): 100 INJECTION SUBCUTANEOUS at 10:45

## 2021-04-29 RX ADMIN — SODIUM CHLORIDE 3 MILLILITER(S): 9 INJECTION INTRAMUSCULAR; INTRAVENOUS; SUBCUTANEOUS at 13:03

## 2021-04-29 RX ADMIN — Medication 15 MILLIGRAM(S): at 18:25

## 2021-04-29 RX ADMIN — Medication 1 PACKET(S): at 10:44

## 2021-04-29 RX ADMIN — Medication 15 MILLIGRAM(S): at 18:40

## 2021-04-29 RX ADMIN — Medication 15 MILLIGRAM(S): at 13:18

## 2021-04-29 RX ADMIN — Medication 15 MILLIGRAM(S): at 06:16

## 2021-04-29 RX ADMIN — Medication 15 MILLIGRAM(S): at 13:03

## 2021-04-29 NOTE — DISCHARGE NOTE PROVIDER - NSDCCPTREATMENT_GEN_ALL_CORE_FT
PRINCIPAL PROCEDURE  Procedure: Colostomy takedown  Findings and Treatment:       SECONDARY PROCEDURE  Procedure: Ventral hernia repair  Findings and Treatment:

## 2021-04-29 NOTE — PROGRESS NOTE ADULT - ATTENDING COMMENTS
Patient is seen and examined at bedside with NP Nuvia Thompson. Ordaz removed- voiding. +flatus, +BM. Diet being advanced, tolerating well for now. Agree with above assessment and plan. D/w pt

## 2021-04-29 NOTE — PROGRESS NOTE ADULT - SUBJECTIVE AND OBJECTIVE BOX
No c/o. Pain well controlled.  Georgie full liquid diet. Passing large amounts of flatus and bloody stool.    MEDICATIONS  (STANDING):  alvimopan 12 milliGRAM(s) Oral two times a day  enoxaparin Injectable 40 milliGRAM(s) SubCutaneous daily  ketorolac   Injectable 15 milliGRAM(s) IV Push every 6 hours  potassium phosphate / sodium phosphate Powder (PHOS-NaK) 1 Packet(s) Oral once  sodium chloride 0.9% lock flush 3 milliLiter(s) IV Push every 8 hours  sodium chloride 0.9% lock flush 3 milliLiter(s) IV Push every 8 hours  sodium chloride 0.9%. 1000 milliLiter(s) (100 mL/Hr) IV Continuous <Continuous>    MEDICATIONS  (PRN):  famotidine Injectable 20 milliGRAM(s) IV Push every 12 hours PRN Dyspepsia  HYDROmorphone  Injectable 1 milliGRAM(s) IV Push every 4 hours PRN Severe Pain (7 - 10)  ondansetron Injectable 4 milliGRAM(s) IV Push every 6 hours PRN Nausea  oxyCODONE    IR 5 milliGRAM(s) Oral every 6 hours PRN Moderate Pain (4 - 6)    Vital Signs Last 24 Hrs  T(C): 36.9 (29 Apr 2021 08:46), Max: 36.9 (29 Apr 2021 08:46)  T(F): 98.5 (29 Apr 2021 08:46), Max: 98.5 (29 Apr 2021 08:46)  HR: 89 (29 Apr 2021 08:46) (89 - 93)  BP: 121/67 (29 Apr 2021 08:46) (111/67 - 121/67)  BP(mean): 80 (29 Apr 2021 08:46) (80 - 80)  RR: 16 (29 Apr 2021 08:46) (16 - 18)  SpO2: 99% (29 Apr 2021 08:46) (94% - 99%)  I&O's Detail    28 Apr 2021 07:01  -  29 Apr 2021 07:00  --------------------------------------------------------  IN:  Total IN: 0 mL    OUT:    Indwelling Catheter - Urethral (mL): 400 mL  Total OUT: 400 mL    Total NET: -400 mL    NAD  ABD exam : colostomy site healthy, soft, approp tender, ND                        13.0   7.91  )-----------( 201      ( 29 Apr 2021 07:33 )             38.2     04-29    141  |  109<H>  |  15  ----------------------------<  93  3.8   |  29  |  1.02    Ca    9.2      29 Apr 2021 07:33  Phos  1.7     04-29  Mg     2.4     04-29

## 2021-04-29 NOTE — DISCHARGE NOTE PROVIDER - CARE PROVIDER_API CALL
Vandana Neri)  ColonRectal Surgery; [place]  [address] [place] [address]  Phone: [phone]  Fax: [phone]  Follow Up Time: 2 weeks

## 2021-04-29 NOTE — DISCHARGE NOTE PROVIDER - NSDCMRMEDTOKEN_GEN_ALL_CORE_FT
oxycodone-acetaminophen 5 mg-325 mg oral tablet: 1 tab(s) orally every 6 hours, As Needed -for moderate pain [id number]

## 2021-04-29 NOTE — DISCHARGE NOTE PROVIDER - HOSPITAL COURSE
s/p colostomy reversal, ventral hernia repair, postop, pain controlled, ambulating with return of bowel function

## 2021-04-29 NOTE — DISCHARGE NOTE PROVIDER - NSDCCPCAREPLAN_GEN_ALL_CORE_FT
PRINCIPAL DISCHARGE DIAGNOSIS  Diagnosis: Colostomy present  Assessment and Plan of Treatment:       SECONDARY DISCHARGE DIAGNOSES  Diagnosis: Ventral hernia  Assessment and Plan of Treatment:

## 2021-04-29 NOTE — PROGRESS NOTE ADULT - SUBJECTIVE AND OBJECTIVE BOX
PCP:  Dr. Maxx Mallory    CHIEF COMPLAINT: reversal of colodtomy    HISTORY OF THE PRESENT ILLNESS: this is a 44 yo male with pmh kidney stones and diverticulitis who 1 year ago had diverticulitis with perforation, with exxtensive acute serositis and large areas of acute pericolonic abscesses, sepsis with subsequent  colectomy with colostomy and is now in PACU S/P reversal. Pt is seen in PACU, still sedated from anesthesia. in NAD, we are consulted for Medical management      4/28/21- up and ambulating, + flatus, + BM    REVIEW OF SYSTEMS:   All 10 systems reviewed in detailed and found to be negative with the exception of what has already been described above      Vital Signs Last 24 Hrs  T(C): 36.9 (04-29-21 @ 08:46), Max: 36.9 (04-29-21 @ 08:46)  T(F): 98.5 (04-29-21 @ 08:46), Max: 98.5 (04-29-21 @ 08:46)  HR: 89 (04-29-21 @ 08:46) (89 - 93)  BP: 121/67 (04-29-21 @ 08:46) (111/67 - 121/67)  BP(mean): 80 (04-29-21 @ 08:46) (80 - 80)  RR: 16 (04-29-21 @ 08:46) (16 - 18)  SpO2: 99% (04-29-21 @ 08:46) (94% - 99%)          PHYSICAL EXAM:  GENERAL: Comfortable, no acute distress   HEAD:  Normocephalic, atraumatic  EYES: EOMI, PERRLA  HEENT: Moist mucous membranes  NECK: Supple, No JVD  NERVOUS SYSTEM:  Alert & Oriented X3, Motor Strength 5/5 B/L upper and lower extremities  CHEST/LUNG: Clear to auscultation bilaterally  HEART: Regular rate and rhythm  ABDOMEN: Soft, appropriate post op tenderness , Nondistended, Bowel sounds hypoactive, + abd dsg with lap site c/d/i  GENITOURINARY: voiding  EXTREMITIES:   No clubbing, cyanosis, or edema  MUSCULOSKELETAL- No muscle tenderness, no joint tenderness  SKIN-no rash    LABS:                                              13.0   7.91  )-----------( 201      ( 29 Apr 2021 07:33 )             38.2       04-29    141  |  109<H>  |  15  ----------------------------<  93  3.8   |  29  |  1.02    Ca    9.2      29 Apr 2021 07:33  Phos  1.7     04-29  Mg     2.4     04-29          CAPILLARY BLOOD GLUCOSE    POCT Blood Glucose.: 99 mg/dL (04.29.21 @ 06:20)  POCT Blood Glucose.: 93 mg/dL (28 Apr 2021 11:53)  POCT Blood Glucose.: 108 mg/dL (28 Apr 2021 05:52)  POCT Blood Glucose.: 123 mg/dL (27 Apr 2021 22:33)  POCT Blood Glucose.: 105 mg/dL (27 Apr 2021 14:49)    MEDICATIONS  (STANDING):  alvimopan 12 milliGRAM(s) Oral two times a day  enoxaparin Injectable 40 milliGRAM(s) SubCutaneous daily  ketorolac   Injectable 15 milliGRAM(s) IV Push every 6 hours  sodium chloride 0.9% lock flush 3 milliLiter(s) IV Push every 8 hours  sodium chloride 0.9% lock flush 3 milliLiter(s) IV Push every 8 hours    MEDICATIONS  (PRN):  famotidine Injectable 20 milliGRAM(s) IV Push every 12 hours PRN Dyspepsia  HYDROmorphone  Injectable 1 milliGRAM(s) IV Push every 4 hours PRN Severe Pain (7 - 10)  ondansetron Injectable 4 milliGRAM(s) IV Push every 6 hours PRN Nausea  oxyCODONE    IR 5 milliGRAM(s) Oral every 6 hours PRN Moderate Pain (4 - 6)    IMPRESSION: 44yo M with above pmh. a/w:  #reversal of colostomy, POD#2  CRS f/u appreciated  diet advanced to low residue  OOB and ambulating  + BM  Incentive spirometry    #VTE prophylaxis  hep sq  Venodynes  Amb    #Dispo- likely home tomorrow

## 2021-04-30 ENCOUNTER — TRANSCRIPTION ENCOUNTER (OUTPATIENT)
Age: 46
End: 2021-04-30

## 2021-04-30 VITALS
HEART RATE: 84 BPM | SYSTOLIC BLOOD PRESSURE: 152 MMHG | DIASTOLIC BLOOD PRESSURE: 88 MMHG | OXYGEN SATURATION: 99 % | TEMPERATURE: 98 F | RESPIRATION RATE: 17 BRPM

## 2021-04-30 LAB
ANION GAP SERPL CALC-SCNC: 5 MMOL/L — SIGNIFICANT CHANGE UP (ref 5–17)
BUN SERPL-MCNC: 15 MG/DL — SIGNIFICANT CHANGE UP (ref 7–23)
CALCIUM SERPL-MCNC: 9.2 MG/DL — SIGNIFICANT CHANGE UP (ref 8.5–10.1)
CHLORIDE SERPL-SCNC: 109 MMOL/L — HIGH (ref 96–108)
CO2 SERPL-SCNC: 27 MMOL/L — SIGNIFICANT CHANGE UP (ref 22–31)
CREAT SERPL-MCNC: 0.86 MG/DL — SIGNIFICANT CHANGE UP (ref 0.5–1.3)
GLUCOSE SERPL-MCNC: 95 MG/DL — SIGNIFICANT CHANGE UP (ref 70–99)
HCT VFR BLD CALC: 35.3 % — LOW (ref 39–50)
HGB BLD-MCNC: 12.1 G/DL — LOW (ref 13–17)
MCHC RBC-ENTMCNC: 29.3 PG — SIGNIFICANT CHANGE UP (ref 27–34)
MCHC RBC-ENTMCNC: 34.3 GM/DL — SIGNIFICANT CHANGE UP (ref 32–36)
MCV RBC AUTO: 85.5 FL — SIGNIFICANT CHANGE UP (ref 80–100)
PHOSPHATE SERPL-MCNC: 2.5 MG/DL — SIGNIFICANT CHANGE UP (ref 2.5–4.5)
PLATELET # BLD AUTO: 195 K/UL — SIGNIFICANT CHANGE UP (ref 150–400)
POTASSIUM SERPL-MCNC: 3.6 MMOL/L — SIGNIFICANT CHANGE UP (ref 3.5–5.3)
POTASSIUM SERPL-SCNC: 3.6 MMOL/L — SIGNIFICANT CHANGE UP (ref 3.5–5.3)
RBC # BLD: 4.13 M/UL — LOW (ref 4.2–5.8)
RBC # FLD: 12.4 % — SIGNIFICANT CHANGE UP (ref 10.3–14.5)
SODIUM SERPL-SCNC: 141 MMOL/L — SIGNIFICANT CHANGE UP (ref 135–145)
WBC # BLD: 5.36 K/UL — SIGNIFICANT CHANGE UP (ref 3.8–10.5)
WBC # FLD AUTO: 5.36 K/UL — SIGNIFICANT CHANGE UP (ref 3.8–10.5)

## 2021-04-30 PROCEDURE — 99232 SBSQ HOSP IP/OBS MODERATE 35: CPT

## 2021-04-30 RX ADMIN — SODIUM CHLORIDE 3 MILLILITER(S): 9 INJECTION INTRAMUSCULAR; INTRAVENOUS; SUBCUTANEOUS at 05:17

## 2021-04-30 RX ADMIN — ENOXAPARIN SODIUM 40 MILLIGRAM(S): 100 INJECTION SUBCUTANEOUS at 11:14

## 2021-04-30 RX ADMIN — Medication 15 MILLIGRAM(S): at 05:37

## 2021-04-30 RX ADMIN — Medication 15 MILLIGRAM(S): at 11:29

## 2021-04-30 RX ADMIN — Medication 15 MILLIGRAM(S): at 00:41

## 2021-04-30 RX ADMIN — Medication 15 MILLIGRAM(S): at 00:44

## 2021-04-30 RX ADMIN — SODIUM CHLORIDE 3 MILLILITER(S): 9 INJECTION INTRAMUSCULAR; INTRAVENOUS; SUBCUTANEOUS at 11:15

## 2021-04-30 RX ADMIN — Medication 15 MILLIGRAM(S): at 11:14

## 2021-04-30 NOTE — PROGRESS NOTE ADULT - SUBJECTIVE AND OBJECTIVE BOX
No c/o. Pain well controlled.  Georgie full liquid diet. Passing large amounts of flatus and bloody stool.

## 2021-04-30 NOTE — DISCHARGE NOTE NURSING/CASE MANAGEMENT/SOCIAL WORK - NSDCPNINST_GEN_ALL_CORE
Call MD for any increase in pain, nausea, vomiting; fever over 101F; swelling, redness, oozing at incision site; pain in calf.

## 2021-04-30 NOTE — PROGRESS NOTE ADULT - ASSESSMENT
45 M s/p reversal of colostomy. pod-1     - clear liquid diet  - pain and nausea control  - out of bed to chair.  - inspiratory spirometer   - gi and dvt ppx   - ivf  - entereg  - monitor bowel function 
POD 2    Adv to low fiber diet.  HLIV.  Anticipate d/c home tomorrow.
POD 3    tolerating low fiber diet  h&h stable  + bowel function  dc today  pain control

## 2021-04-30 NOTE — PROGRESS NOTE ADULT - SUBJECTIVE AND OBJECTIVE BOX
PCP:  Dr. Maxx Mallory    CHIEF COMPLAINT: reversal of colodtomy    HISTORY OF THE PRESENT ILLNESS: this is a 46 yo male with pmh kidney stones and diverticulitis who 1 year ago had diverticulitis with perforation, with exxtensive acute serositis and large areas of acute pericolonic abscesses, sepsis with subsequent  colectomy with colostomy and is now in PACU S/P reversal. Pt is seen in PACU, still sedated from anesthesia. in NAD, we are consulted for Medical management      4/30/21- up and ambulating, + flatus, + BM    REVIEW OF SYSTEMS:   All 10 systems reviewed in detailed and found to be negative with the exception of what has already been described above    Vital Signs Last 24 Hrs  T(C): 36.8 (04-30-21 @ 08:22), Max: 37.1 (04-30-21 @ 00:06)  T(F): 98.3 (04-30-21 @ 08:22), Max: 98.7 (04-30-21 @ 00:06)  HR: 84 (04-30-21 @ 08:22) (83 - 94)  BP: 152/88 (04-30-21 @ 08:22) (106/58 - 152/88)  BP(mean): --  RR: 17 (04-30-21 @ 08:22) (16 - 17)  SpO2: 99% (04-30-21 @ 08:22) (97% - 99%)        PHYSICAL EXAM:  GENERAL: Comfortable, no acute distress   HEAD:  Normocephalic, atraumatic  EYES: EOMI, PERRLA  HEENT: Moist mucous membranes  NECK: Supple, No JVD  NERVOUS SYSTEM:  Alert & Oriented X3, Motor Strength 5/5 B/L upper and lower extremities  CHEST/LUNG: Clear to auscultation bilaterally  HEART: Regular rate and rhythm  ABDOMEN: Soft, appropriate post op tenderness , Nondistended, Bowel sounds hypoactive, + abd dsg with lap site c/d/i, + BM's  GENITOURINARY: voiding  EXTREMITIES:   No clubbing, cyanosis, or edema  MUSCULOSKELETAL- No muscle tenderness, no joint tenderness  SKIN-no rash    LABS:                                              12.1   5.36  )-----------( 195      ( 30 Apr 2021 10:31 )             35.3       04-30    141  |  109<H>  |  15  ----------------------------<  95  3.6   |  27  |  0.86    Ca    9.2      30 Apr 2021 10:31  Phos  2.5     04-30  Mg     2.4     04-29                CAPILLARY BLOOD GLUCOSE        POCT Blood Glucose.: 99 mg/dL (04.29.21 @ 06:20)  POCT Blood Glucose.: 93 mg/dL (28 Apr 2021 11:53)  POCT Blood Glucose.: 108 mg/dL (28 Apr 2021 05:52)  POCT Blood Glucose.: 123 mg/dL (27 Apr 2021 22:33)  POCT Blood Glucose.: 105 mg/dL (27 Apr 2021 14:49)    MEDICATIONS  (STANDING):  alvimopan 12 milliGRAM(s) Oral two times a day  enoxaparin Injectable 40 milliGRAM(s) SubCutaneous daily  ketorolac   Injectable 15 milliGRAM(s) IV Push every 6 hours  sodium chloride 0.9% lock flush 3 milliLiter(s) IV Push every 8 hours  sodium chloride 0.9% lock flush 3 milliLiter(s) IV Push every 8 hours    MEDICATIONS  (PRN):  famotidine Injectable 20 milliGRAM(s) IV Push every 12 hours PRN Dyspepsia  HYDROmorphone  Injectable 1 milliGRAM(s) IV Push every 4 hours PRN Severe Pain (7 - 10)  ondansetron Injectable 4 milliGRAM(s) IV Push every 6 hours PRN Nausea  oxyCODONE    IR 5 milliGRAM(s) Oral every 6 hours PRN Moderate Pain (4 - 6)    IMPRESSION: 44yo M with above pmh. a/w:  #reversal of colostomy, POD#3  CRS f/u appreciated  diet advanced to low residue  OOB and ambulating  + blood bm's:  HH stable, seen by CRS  Incentive spirometry    #VTE prophylaxis  hep sq  Venodynes  Amb    #Dispo- likely home today           PCP:  Dr. Maxx Mallory    CHIEF COMPLAINT: reversal of colodtomy    HISTORY OF THE PRESENT ILLNESS: this is a 44 yo male with pmh kidney stones and diverticulitis who 1 year ago had diverticulitis with perforation, with exxtensive acute serositis and large areas of acute pericolonic abscesses, sepsis with subsequent  colectomy with colostomy. He is admitted for colostomy reversal.   Hospitalist service consulted for medical comanagement.     4/30/21- Pt seen and examined this am. up and ambulating, + flatus, + BM.     REVIEW OF SYSTEMS:   All 10 systems reviewed in detailed and found to be negative with the exception of what has already been described above    Vital Signs Last 24 Hrs  T(C): 36.8 (04-30-21 @ 08:22), Max: 37.1 (04-30-21 @ 00:06)  T(F): 98.3 (04-30-21 @ 08:22), Max: 98.7 (04-30-21 @ 00:06)  HR: 84 (04-30-21 @ 08:22) (83 - 94)  BP: 152/88 (04-30-21 @ 08:22) (106/58 - 152/88)  RR: 17 (04-30-21 @ 08:22) (16 - 17)  SpO2: 99% (04-30-21 @ 08:22) (97% - 99%)        PHYSICAL EXAM:  GENERAL: Comfortable, no acute distress   HEAD:  Normocephalic, atraumatic  EYES: EOMI, PERRLA  HEENT: Moist mucous membranes  NECK: Supple, No JVD  NERVOUS SYSTEM:  Alert & Oriented X3, Motor Strength 5/5 B/L upper and lower extremities  CHEST/LUNG: Clear to auscultation bilaterally  HEART: Regular rate and rhythm  ABDOMEN: Soft, appropriate post op tenderness , Nondistended, Bowel sounds +. + abd dsg with lap site c/d/i  GENITOURINARY: voiding  EXTREMITIES:   No clubbing, cyanosis, or edema  MUSCULOSKELETAL- No muscle tenderness, no joint tenderness  SKIN-no rash    LABS:                                              12.1   5.36  )-----------( 195      ( 30 Apr 2021 10:31 )             35.3       04-30    141  |  109<H>  |  15  ----------------------------<  95  3.6   |  27  |  0.86    Ca    9.2      30 Apr 2021 10:31  Phos  2.5     04-30  Mg     2.4     04-29  MEDICATIONS  (STANDING):  alvimopan 12 milliGRAM(s) Oral two times a day  enoxaparin Injectable 40 milliGRAM(s) SubCutaneous daily  ketorolac   Injectable 15 milliGRAM(s) IV Push every 6 hours  sodium chloride 0.9% lock flush 3 milliLiter(s) IV Push every 8 hours  sodium chloride 0.9% lock flush 3 milliLiter(s) IV Push every 8 hours    MEDICATIONS  (PRN):  famotidine Injectable 20 milliGRAM(s) IV Push every 12 hours PRN Dyspepsia  HYDROmorphone  Injectable 1 milliGRAM(s) IV Push every 4 hours PRN Severe Pain (7 - 10)  ondansetron Injectable 4 milliGRAM(s) IV Push every 6 hours PRN Nausea  oxyCODONE    IR 5 milliGRAM(s) Oral every 6 hours PRN Moderate Pain (4 - 6)    ASSESSMENT AND PLAN:    44yo M with above pmh. a/w:    #Reversal of colostomy, POD#3  CRS f/u appreciated  diet advanced to low residue  OOB and ambulating  + blood bm's:  HH stable, seen by CRS  Incentive spirometry    #VTE prophylaxis  hep sq  Venodynes  Amb    #Dispo- likely home today

## 2021-04-30 NOTE — DISCHARGE NOTE NURSING/CASE MANAGEMENT/SOCIAL WORK - PATIENT PORTAL LINK FT
You can access the FollowMyHealth Patient Portal offered by [facility] by registering at the following website: [email or web address]. By joining [employer]’s FollowMyHealth portal, you will also be able to view your health information using other applications (apps) compatible with our system.

## 2021-04-30 NOTE — PROGRESS NOTE ADULT - ATTENDING COMMENTS
Pt seen and examined with NP Nuvia Aguilar. AGree with documentation as above with changes made where appropriate.   PT admitted for reversal of colostomy. Doing well. Having some bleeding with bms. Expected per his d/w crs.   Likely home today.

## 2021-05-08 ENCOUNTER — NON-APPOINTMENT (OUTPATIENT)
Age: 46
End: 2021-05-08

## 2021-05-10 DIAGNOSIS — K43.9 VENTRAL HERNIA WITHOUT OBSTRUCTION OR GANGRENE: ICD-10-CM

## 2021-05-10 DIAGNOSIS — Z43.3 ENCOUNTER FOR ATTENTION TO COLOSTOMY: ICD-10-CM

## 2021-05-10 DIAGNOSIS — Z87.442 PERSONAL HISTORY OF URINARY CALCULI: ICD-10-CM

## 2021-05-11 DIAGNOSIS — N39.0 URINARY TRACT INFECTION, SITE NOT SPECIFIED: ICD-10-CM

## 2021-05-11 RX ORDER — METRONIDAZOLE 500 MG/1
500 TABLET ORAL
Qty: 3 | Refills: 0 | Status: DISCONTINUED | COMMUNITY
Start: 2020-07-22 | End: 2021-05-11

## 2021-05-11 RX ORDER — NEOMYCIN SULFATE 500 MG/1
500 TABLET ORAL
Qty: 6 | Refills: 0 | Status: DISCONTINUED | COMMUNITY
Start: 2020-07-22 | End: 2021-05-11

## 2021-05-21 ENCOUNTER — APPOINTMENT (OUTPATIENT)
Dept: COLORECTAL SURGERY | Facility: CLINIC | Age: 46
End: 2021-05-21

## 2021-10-08 NOTE — ED ADULT NURSE NOTE - CARDIO ASSESSMENT
Patient Education     Viral Upper Respiratory Illness (Child)  Your child has a viral upper respiratory illness (URI). This is also called a common cold. The virus is contagious during the first few days. It is spread through the air by coughing or sneezing, or by direct contact. This means by touching your sick child then touching your own eyes, nose, or mouth. Washing your hands often will decrease risk of spreading the virus. Most viral illnesses go away within 7 to 14 days with rest and simple home remedies. But they may sometimes last up to 4 weeks. Antibiotics will not kill a virus. They are generally not prescribed for this condition.     Home care  · Fluids. Fever increases the amount of water lost from the body. Encourage your child to drink lots of fluids to loosen lung secretions and make it easier to breathe.   ? For babies under 1 year old,  continue regular formula feedings or breastfeeding. Between feedings, give oral rehydration solution. This is available from drugstores and grocery stores without a prescription.  ? For children over 1 year old, give plenty of fluids, such as water, juice, gelatin water, soda without caffeine, ginger ale, lemonade, or ice pops.  · Eating. If your child doesn't want to eat solid foods, it's OK for a few days, as long as he or she drinks lots of fluid.  · Rest. Keep children with fever at home resting or playing quietly until the fever is gone. Encourage frequent naps. Your child may return to  or school when the fever is gone and he or she is eating well, does not tire easily, and is feeling better.  · Sleep. Periods of sleeplessness and irritability are common.  ? Children 1 year and older:  Have your child sleep in a slightly upright position. This is to help make breathing easier. If possible, raise the head of the bed slightly. Or raise your older child’s head and upper body up with extra pillows. Talk with your healthcare provider about how far to raise  your child's head.  ? Babies younger than 12 months: Never use pillows or put your baby to sleep on their stomach or side. Babies younger than 12 months should sleep on a flat surface on their back. Don't use car seats, strollers, swings, baby carriers, and baby slings for sleep. If your baby falls asleep in one of these, move them to a flat, firm surface as soon as you can.     · Cough. Coughing is a normal part of this illness. A cool mist humidifier at the bedside may help. Clean the humidifier every day to prevent mold. Over-the-counter cough and cold medicines don't help any better than syrup with no medicine in it. They also can cause serious side effects, especially in babies under 2 years of age. Don't give OTC cough or cold medicines to children under 6 years unless your healthcare provider has specifically advised you to do so.  ? Keep your child away from cigarette smoke. It can make the cough worse. Don't let anyone smoke in your house or car.  · Nasal congestion. Suction the nose of babies with a bulb syringe. You may put 2 to 3 drops of saltwater (saline) nose drops in each nostril before suctioning. This helps thin and remove secretions. Saline nose drops are available without a prescription. You can also use 1/4 teaspoon of table salt dissolved in 1 cup of water.  · Fever. Use children’s acetaminophen for fever, fussiness, or discomfort, unless another medicine was prescribed. In babies over 6 months of age, you may use children’s ibuprofen or acetaminophen. If your child has chronic liver or kidney disease, talk with your child's healthcare provider before using these medicines. Also talk with the provider if your child has had a stomach ulcer or digestive bleeding. Never give aspirin to anyone younger than 18 years of age who is ill with a viral infection or fever. It may cause severe liver or brain damage.  · Preventing spread. Washing your hands before and after touching your sick child will help  prevent a new infection. It will also help prevent the spread of this viral illness to yourself and other children. In an age-appropriate manner, teach your children when, how, and why to wash their hands. Role model correct handwashing. Encourage adults in your home to wash hands often.    Follow-up care  Follow up with your healthcare provider, or as advised.  When to seek medical advice  For a usually healthy child, call your child's healthcare provider right away if any of these occur:   · A fever (see Fever and children, below)  · Earache, sinus pain, stiff or painful neck, headache, repeated diarrhea, or vomiting.  · Unusual fussiness.  · A new rash appears.  · Your child is dehydrated, with one or more of these symptoms:  ? No tears when crying.  ? “Sunken” eyes or a dry mouth.  ? No wet diapers for 8 hours in infants.  ? Reduced urine output in older children.  · Your child has new symptoms or you are worried or confused by your child's condition.  Call 911  Call 911 if any of these occur:   · Increased wheezing or difficulty breathing  · Unusual drowsiness or confusion  · Fast breathing:  ? Birth to 6 weeks: over 60 breaths per minute  ? 6 weeks to 2 years: over 45 breaths per minute  ? 3 to 6 years: over 35 breaths per minute  ? 7 to 10 years: over 30 breaths per minute  ? Older than 10 years: over 25 breaths per minute  Fever and children  Always use a digital thermometer to check your child’s temperature. Never use a mercury thermometer.   For infants and toddlers, be sure to use a rectal thermometer correctly. A rectal thermometer may accidentally poke a hole in (perforate) the rectum. It may also pass on germs from the stool. Always follow the product maker’s directions for proper use. If you don’t feel comfortable taking a rectal temperature, use another method. When you talk to your child’s healthcare provider, tell him or her which method you used to take your child’s temperature.   Here are  guidelines for fever temperature. Ear temperatures aren’t accurate before 6 months of age. Don’t take an oral temperature until your child is at least 4 years old.   Infant under 3 months old:  · Ask your child’s healthcare provider how you should take the temperature.  · Rectal or forehead (temporal artery) temperature of 100.4°F (38°C) or higher, or as directed by the provider  · Armpit temperature of 99°F (37.2°C) or higher, or as directed by the provider  Child age 3 to 36 months:  · Rectal, forehead (temporal artery), or ear temperature of 102°F (38.9°C) or higher, or as directed by the provider  · Armpit temperature of 101°F (38.3°C) or higher, or as directed by the provider  Child of any age:  · Repeated temperature of 104°F (40°C) or higher, or as directed by the provider  · Fever that lasts more than 24 hours in a child under 2 years old. Or a fever that lasts for 3 days in a child 2 years or older.  [employer] last reviewed this educational content on 6/1/2018 © 2000-2021 [employer]. All rights reserved. This information is not intended as a substitute for professional medical care. Always follow your healthcare professional's instructions.            ---
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
## 2022-01-18 NOTE — PATIENT PROFILE ADULT - FUNCTIONAL SCREEN CURRENT LEVEL: BATHING, MLM
Discontinue Regimen: Azelaic acid 15% gel once daily\\nHydroquinone 4% cream once nightly
Initiate Treatment: Azelaic acid 20% cream twice daily or once daily in AM if it’s too drying
Continue Regimen: Vitamin C lotion once daily\\nSPF QAM
Detail Level: Zone
Plan: **An  was used during this visit.  Yesica [id number])
0 = independent

## 2022-05-10 DIAGNOSIS — Z43.3 ENCOUNTER FOR ATTENTION TO COLOSTOMY: ICD-10-CM

## 2022-05-10 RX ORDER — SULFAMETHOXAZOLE AND TRIMETHOPRIM 800; 160 MG/1; MG/1
800-160 TABLET ORAL TWICE DAILY
Qty: 14 | Refills: 0 | Status: DISCONTINUED | COMMUNITY
Start: 2021-05-11 | End: 2022-05-10

## 2022-05-10 NOTE — ASSESSMENT
[FreeTextEntry1] : 46 year old male who is s/p colostomy reversal in April 2021.  Now with abdominal discomfort and enlarging incisional hernia.  He currently has no obstructive symptoms.  I recommend getting a CT A/P to evaluate this area to r/o incisional hernia.  He will follow up after imaging is completed.

## 2022-05-10 NOTE — HISTORY OF PRESENT ILLNESS
[FreeTextEntry1] : 46 year old male who underwent a colostomy reversal April 2021.  Overall, was doing well. But he has noticed that he's developed a "lump" at his old colostomy closure site.  He stated that over the last couple of months it has grown larger and is becoming more uncomfortable. He denies obstructive symptoms, n/v or fever/chills.

## 2022-05-10 NOTE — PHYSICAL EXAM
[Abdomen Masses] : No abdominal masses [Abdomen Tenderness] : ~T No ~M abdominal tenderness [Respiratory Effort] : normal respiratory effort [Alert] : alert [Oriented to Person] : oriented to person [Oriented to Place] : oriented to place [Oriented to Time] : oriented to time [Calm] : calm [de-identified] : soft, NDNT. LUQ previous colostomy closure site with bulge/hernia  [de-identified] : no acute distress [de-identified] : JUAQUIN x4 [de-identified] : warm and dry

## 2022-05-24 ENCOUNTER — RESULT REVIEW (OUTPATIENT)
Age: 47
End: 2022-05-24

## 2022-05-26 NOTE — PATIENT PROFILE ADULT - ARRIVAL FROM
Detail Level: Simple Additional Notes: Patient consent was obtained to proceed with the visit and recommended plan of care after discussion of all risks and benefits, including the risks of COVID-19 exposure. Render Risk Assessment In Note?: yes Home

## 2022-06-11 ENCOUNTER — APPOINTMENT (OUTPATIENT)
Dept: CT IMAGING | Facility: CLINIC | Age: 47
End: 2022-06-11

## 2022-06-29 ENCOUNTER — NON-APPOINTMENT (OUTPATIENT)
Age: 47
End: 2022-06-29

## 2022-07-30 ENCOUNTER — APPOINTMENT (OUTPATIENT)
Dept: CT IMAGING | Facility: CLINIC | Age: 47
End: 2022-07-30

## 2022-07-30 ENCOUNTER — OUTPATIENT (OUTPATIENT)
Dept: OUTPATIENT SERVICES | Facility: HOSPITAL | Age: 47
LOS: 1 days | End: 2022-07-30
Payer: COMMERCIAL

## 2022-07-30 DIAGNOSIS — K43.2 INCISIONAL HERNIA WITHOUT OBSTRUCTION OR GANGRENE: ICD-10-CM

## 2022-07-30 DIAGNOSIS — Z90.49 ACQUIRED ABSENCE OF OTHER SPECIFIED PARTS OF DIGESTIVE TRACT: Chronic | ICD-10-CM

## 2022-07-30 PROCEDURE — 74177 CT ABD & PELVIS W/CONTRAST: CPT | Mod: 26

## 2022-07-30 PROCEDURE — 74177 CT ABD & PELVIS W/CONTRAST: CPT

## 2022-08-02 ENCOUNTER — APPOINTMENT (OUTPATIENT)
Dept: COLORECTAL SURGERY | Facility: CLINIC | Age: 47
End: 2022-08-02

## 2022-08-12 ENCOUNTER — APPOINTMENT (OUTPATIENT)
Dept: COLORECTAL SURGERY | Facility: CLINIC | Age: 47
End: 2022-08-12

## 2022-08-12 PROCEDURE — 99441: CPT

## 2022-08-15 RX ORDER — POLYETHYLENE GLYCOL 3350 AND ELECTROLYTES WITH LEMON FLAVOR 236; 22.74; 6.74; 5.86; 2.97 G/4L; G/4L; G/4L; G/4L; G/4L
236 POWDER, FOR SOLUTION ORAL
Qty: 1 | Refills: 0 | Status: ACTIVE | COMMUNITY
Start: 2022-08-15 | End: 1900-01-01

## 2022-08-15 NOTE — ASSESSMENT
[FreeTextEntry1] : 47 year old male with symptomatic incarcerated ventral incisional hernia. recommend laparoscopic possible open hernia repair with mesh. risk and benefits explained including bleeding, infections, recurrence of hernia in 15-20%, dvt, pe, mi, and death

## 2022-08-15 NOTE — DATA REVIEWED
[FreeTextEntry1] : CT scan shows incarcerated incisional hernia at previous ostomy site and umbilical hernia.

## 2022-08-15 NOTE — REVIEW OF SYSTEMS
[As Noted in HPI] : as noted in HPI [Abdominal Pain] : abdominal pain [Negative] : Heme/Lymph [FreeTextEntry7] : enlarging hernias

## 2022-08-15 NOTE — HISTORY OF PRESENT ILLNESS
[FreeTextEntry1] : this is a telehealth consultation, consent obtained from patient. consultation requested by Dr. Neri. 47 year old male who had prior surgery for perforated complicated sigmoid diverticulitis with reversal of colostomy, now has enlarging incarcerated ventral incisional hernia with pain

## 2022-08-15 NOTE — PHYSICAL EXAM
[Alert] : alert [Oriented to Person] : oriented to person [Oriented to Place] : oriented to place [Oriented to Time] : oriented to time [Calm] : calm

## 2022-10-18 ENCOUNTER — NON-APPOINTMENT (OUTPATIENT)
Age: 47
End: 2022-10-18

## 2022-11-21 ENCOUNTER — NON-APPOINTMENT (OUTPATIENT)
Age: 47
End: 2022-11-21

## 2022-11-29 ENCOUNTER — APPOINTMENT (OUTPATIENT)
Dept: COLORECTAL SURGERY | Facility: CLINIC | Age: 47
End: 2022-11-29

## 2022-12-06 ENCOUNTER — APPOINTMENT (OUTPATIENT)
Dept: COLORECTAL SURGERY | Facility: CLINIC | Age: 47
End: 2022-12-06

## 2022-12-06 DIAGNOSIS — K43.2 INCISIONAL HERNIA W/OUT OBSTRUCTION OR GANGRENE: ICD-10-CM

## 2022-12-06 PROCEDURE — 99443: CPT

## 2022-12-06 NOTE — HISTORY OF PRESENT ILLNESS
[FreeTextEntry1] : this is a telehealth visit consent obtained from patient. consultation requested by Dr. Neri. 47 year old male who had prior surgery for perforated complicated sigmoid diverticulitis with reversal of colostomy, now has enlarging incarcerated ventral incisional hernia with pain

## 2022-12-20 ENCOUNTER — APPOINTMENT (OUTPATIENT)
Dept: COLORECTAL SURGERY | Facility: CLINIC | Age: 47
End: 2022-12-20

## 2022-12-20 VITALS
HEIGHT: 72 IN | WEIGHT: 210 LBS | RESPIRATION RATE: 16 BRPM | SYSTOLIC BLOOD PRESSURE: 147 MMHG | DIASTOLIC BLOOD PRESSURE: 85 MMHG | HEART RATE: 76 BPM | BODY MASS INDEX: 28.44 KG/M2

## 2022-12-20 DIAGNOSIS — K43.6 OTHER AND UNSPECIFIED VENTRAL HERNIA WITH OBSTRUCTION, W/OUT GANGRENE: ICD-10-CM

## 2022-12-20 PROCEDURE — 99214 OFFICE O/P EST MOD 30 MIN: CPT

## 2022-12-21 PROBLEM — K43.6 INCARCERATED VENTRAL HERNIA: Status: ACTIVE | Noted: 2022-08-15

## 2022-12-21 NOTE — HISTORY OF PRESENT ILLNESS
[FreeTextEntry1] :  47 year old male who had prior surgery for perforated complicated sigmoid diverticulitis with reversal of colostomy, now has enlarging incarcerated ventral incisional hernia with pain

## 2022-12-21 NOTE — DATA REVIEWED
[FreeTextEntry1] : CT scan shows incarcerated incisional hernia at previous ostomy site and umbilical hernia. 
ATU sonogram

## 2022-12-21 NOTE — PHYSICAL EXAM
[Normal Breath Sounds] : Normal breath sounds [Alert] : alert [Oriented to Person] : oriented to person [Oriented to Place] : oriented to place [Oriented to Time] : oriented to time [Calm] : calm [de-identified] : obese, 5cm hernia in left abdomen at previous colostomy site and also around umbilicus 2cm [de-identified] : looks well, nad

## 2022-12-21 NOTE — ASSESSMENT
[FreeTextEntry1] : 47 year old male with symptomatic incarcerated ventral incisional hernia. recommend laparoscopic possible open hernia repair with mesh. risk and benefits explained including bleeding, infections, recurrence of hernia in 15-20%, dvt, pe, mi, and death. encouraged weight reduction before surgery. also mentioned that cosmetically his abdomen will not look like it did before all his surgeries.

## 2023-01-11 ENCOUNTER — APPOINTMENT (OUTPATIENT)
Dept: COLORECTAL SURGERY | Facility: HOSPITAL | Age: 48
End: 2023-01-11

## 2023-03-21 ENCOUNTER — RESULT REVIEW (OUTPATIENT)
Age: 48
End: 2023-03-21

## 2023-03-21 ENCOUNTER — OUTPATIENT (OUTPATIENT)
Dept: OUTPATIENT SERVICES | Facility: HOSPITAL | Age: 48
LOS: 1 days | End: 2023-03-21
Payer: COMMERCIAL

## 2023-03-21 VITALS
TEMPERATURE: 98 F | RESPIRATION RATE: 16 BRPM | OXYGEN SATURATION: 100 % | HEART RATE: 74 BPM | WEIGHT: 210.1 LBS | SYSTOLIC BLOOD PRESSURE: 140 MMHG | HEIGHT: 72 IN | DIASTOLIC BLOOD PRESSURE: 80 MMHG

## 2023-03-21 DIAGNOSIS — Z87.19 PERSONAL HISTORY OF OTHER DISEASES OF THE DIGESTIVE SYSTEM: ICD-10-CM

## 2023-03-21 DIAGNOSIS — K43.6 OTHER AND UNSPECIFIED VENTRAL HERNIA WITH OBSTRUCTION, WITHOUT GANGRENE: ICD-10-CM

## 2023-03-21 DIAGNOSIS — Z98.890 OTHER SPECIFIED POSTPROCEDURAL STATES: Chronic | ICD-10-CM

## 2023-03-21 DIAGNOSIS — Z01.818 ENCOUNTER FOR OTHER PREPROCEDURAL EXAMINATION: ICD-10-CM

## 2023-03-21 DIAGNOSIS — Z90.49 ACQUIRED ABSENCE OF OTHER SPECIFIED PARTS OF DIGESTIVE TRACT: Chronic | ICD-10-CM

## 2023-03-21 LAB
A1C WITH ESTIMATED AVERAGE GLUCOSE RESULT: 5.5 % — SIGNIFICANT CHANGE UP (ref 4–5.6)
APTT BLD: 36.9 SEC — HIGH (ref 27.5–35.5)
BASOPHILS # BLD AUTO: 0.02 K/UL — SIGNIFICANT CHANGE UP (ref 0–0.2)
BASOPHILS NFR BLD AUTO: 0.5 % — SIGNIFICANT CHANGE UP (ref 0–2)
BLD GP AB SCN SERPL QL: SIGNIFICANT CHANGE UP
BUN SERPL-MCNC: 18 MG/DL — SIGNIFICANT CHANGE UP (ref 7–23)
CALCIUM SERPL-MCNC: 9.9 MG/DL — SIGNIFICANT CHANGE UP (ref 8.5–10.1)
CHLORIDE SERPL-SCNC: 107 MMOL/L — SIGNIFICANT CHANGE UP (ref 96–108)
CO2 SERPL-SCNC: 30 MMOL/L — SIGNIFICANT CHANGE UP (ref 22–31)
CREAT SERPL-MCNC: 1.09 MG/DL — SIGNIFICANT CHANGE UP (ref 0.5–1.3)
EGFR: 84 ML/MIN/1.73M2 — SIGNIFICANT CHANGE UP
EOSINOPHIL # BLD AUTO: 0.03 K/UL — SIGNIFICANT CHANGE UP (ref 0–0.5)
EOSINOPHIL NFR BLD AUTO: 0.7 % — SIGNIFICANT CHANGE UP (ref 0–6)
ESTIMATED AVERAGE GLUCOSE: 111 MG/DL — SIGNIFICANT CHANGE UP (ref 68–114)
GLUCOSE SERPL-MCNC: 105 MG/DL — HIGH (ref 70–99)
HCT VFR BLD CALC: 47.2 % — SIGNIFICANT CHANGE UP (ref 39–50)
HGB BLD-MCNC: 16.3 G/DL — SIGNIFICANT CHANGE UP (ref 13–17)
IMM GRANULOCYTES NFR BLD AUTO: 0.2 % — SIGNIFICANT CHANGE UP (ref 0–0.9)
INR BLD: 1.05 RATIO — SIGNIFICANT CHANGE UP (ref 0.88–1.16)
LYMPHOCYTES # BLD AUTO: 1.08 K/UL — SIGNIFICANT CHANGE UP (ref 1–3.3)
LYMPHOCYTES # BLD AUTO: 26 % — SIGNIFICANT CHANGE UP (ref 13–44)
MCHC RBC-ENTMCNC: 29.8 PG — SIGNIFICANT CHANGE UP (ref 27–34)
MCHC RBC-ENTMCNC: 34.5 GM/DL — SIGNIFICANT CHANGE UP (ref 32–36)
MCV RBC AUTO: 86.3 FL — SIGNIFICANT CHANGE UP (ref 80–100)
MONOCYTES # BLD AUTO: 0.29 K/UL — SIGNIFICANT CHANGE UP (ref 0–0.9)
MONOCYTES NFR BLD AUTO: 7 % — SIGNIFICANT CHANGE UP (ref 2–14)
MRSA PCR RESULT.: SIGNIFICANT CHANGE UP
NEUTROPHILS # BLD AUTO: 2.72 K/UL — SIGNIFICANT CHANGE UP (ref 1.8–7.4)
NEUTROPHILS NFR BLD AUTO: 65.6 % — SIGNIFICANT CHANGE UP (ref 43–77)
PLATELET # BLD AUTO: 223 K/UL — SIGNIFICANT CHANGE UP (ref 150–400)
POTASSIUM SERPL-MCNC: 4.4 MMOL/L — SIGNIFICANT CHANGE UP (ref 3.5–5.3)
POTASSIUM SERPL-SCNC: 4.4 MMOL/L — SIGNIFICANT CHANGE UP (ref 3.5–5.3)
PROTHROM AB SERPL-ACNC: 12.2 SEC — SIGNIFICANT CHANGE UP (ref 10.5–13.4)
RBC # BLD: 5.47 M/UL — SIGNIFICANT CHANGE UP (ref 4.2–5.8)
RBC # FLD: 12 % — SIGNIFICANT CHANGE UP (ref 10.3–14.5)
S AUREUS DNA NOSE QL NAA+PROBE: DETECTED
SODIUM SERPL-SCNC: 136 MMOL/L — SIGNIFICANT CHANGE UP (ref 135–145)
WBC # BLD: 4.15 K/UL — SIGNIFICANT CHANGE UP (ref 3.8–10.5)
WBC # FLD AUTO: 4.15 K/UL — SIGNIFICANT CHANGE UP (ref 3.8–10.5)

## 2023-03-21 PROCEDURE — 87641 MR-STAPH DNA AMP PROBE: CPT

## 2023-03-21 PROCEDURE — 83036 HEMOGLOBIN GLYCOSYLATED A1C: CPT

## 2023-03-21 PROCEDURE — 36415 COLL VENOUS BLD VENIPUNCTURE: CPT

## 2023-03-21 PROCEDURE — 93005 ELECTROCARDIOGRAM TRACING: CPT

## 2023-03-21 PROCEDURE — 85730 THROMBOPLASTIN TIME PARTIAL: CPT

## 2023-03-21 PROCEDURE — 85025 COMPLETE CBC W/AUTO DIFF WBC: CPT

## 2023-03-21 PROCEDURE — 86850 RBC ANTIBODY SCREEN: CPT

## 2023-03-21 PROCEDURE — 80048 BASIC METABOLIC PNL TOTAL CA: CPT

## 2023-03-21 PROCEDURE — 71046 X-RAY EXAM CHEST 2 VIEWS: CPT | Mod: 26

## 2023-03-21 PROCEDURE — 87640 STAPH A DNA AMP PROBE: CPT

## 2023-03-21 PROCEDURE — 71046 X-RAY EXAM CHEST 2 VIEWS: CPT

## 2023-03-21 PROCEDURE — 93010 ELECTROCARDIOGRAM REPORT: CPT

## 2023-03-21 PROCEDURE — 85610 PROTHROMBIN TIME: CPT

## 2023-03-21 PROCEDURE — 86901 BLOOD TYPING SEROLOGIC RH(D): CPT

## 2023-03-21 PROCEDURE — 86900 BLOOD TYPING SEROLOGIC ABO: CPT

## 2023-03-21 PROCEDURE — 99214 OFFICE O/P EST MOD 30 MIN: CPT | Mod: 25

## 2023-03-21 NOTE — H&P PST ADULT - NSICDXPROCEDURE_GEN_ALL_CORE_FT
PROCEDURES:  Laparoscopic repair of ventral hernia with component release 21-Mar-2023 07:30:36 with mesh placement Laura Hardy

## 2023-03-21 NOTE — H&P PST ADULT - NSICDXPASTSURGICALHX_GEN_ALL_CORE_FT
PAST SURGICAL HISTORY:  H/O colectomy colostomy 2/2020    History of colostomy reversal     S/P colon resection

## 2023-03-21 NOTE — H&P PST ADULT - ASSESSMENT
48 y/o male presents to [facility] for scheduled laparoscopic incisional ventral hernia with mesh repair on 3/30/23.

## 2023-03-21 NOTE — H&P PST ADULT - HISTORY OF PRESENT ILLNESS
48 y/o male presents to [facility] for scheduled laparoscopic incisional ventral hernia with mesh repair on 3/30/23. Patient with hx of diverticulosis s/p colon resection and colostomy s/p reversal colostomy in 2021 , now with abdominal budge at incision site.

## 2023-03-21 NOTE — H&P PST ADULT - PROBLEM SELECTOR PLAN 1
Pre op, mupirocin and chlorhexidine instructions given and explained.  Avoid NSAIDs and OTC supplements.   Patient verbalized understanding  medical consult requested by surgeon 3 /23/23

## 2023-03-21 NOTE — H&P PST ADULT - NSICDXFAMILYHX_GEN_ALL_CORE_FT
FAMILY HISTORY:  Father  Still living? Unknown  Family history of lymphoma, Age at diagnosis: Age Unknown    Uncle  Still living? Unknown  Family history of leukemia, Age at diagnosis: Age Unknown

## 2023-03-21 NOTE — H&P PST ADULT - GASTROINTESTINAL COMMENTS
left abdomen budge noted with tenderness, umbical protrusion see hpi - incisional pain associated with budge

## 2023-03-22 DIAGNOSIS — Z01.818 ENCOUNTER FOR OTHER PREPROCEDURAL EXAMINATION: ICD-10-CM

## 2023-03-22 DIAGNOSIS — K43.6 OTHER AND UNSPECIFIED VENTRAL HERNIA WITH OBSTRUCTION, WITHOUT GANGRENE: ICD-10-CM

## 2023-03-25 RX ORDER — HEPARIN SODIUM 5000 [USP'U]/ML
5000 INJECTION INTRAVENOUS; SUBCUTANEOUS ONCE
Refills: 0 | Status: DISCONTINUED | OUTPATIENT
Start: 2023-03-30 | End: 2023-03-30

## 2023-03-30 ENCOUNTER — OUTPATIENT (OUTPATIENT)
Dept: INPATIENT UNIT | Facility: HOSPITAL | Age: 48
LOS: 1 days | Discharge: HOME | End: 2023-03-30
Payer: COMMERCIAL

## 2023-03-30 ENCOUNTER — TRANSCRIPTION ENCOUNTER (OUTPATIENT)
Age: 48
End: 2023-03-30

## 2023-03-30 ENCOUNTER — APPOINTMENT (OUTPATIENT)
Dept: COLORECTAL SURGERY | Facility: HOSPITAL | Age: 48
End: 2023-03-30

## 2023-03-30 ENCOUNTER — RESULT REVIEW (OUTPATIENT)
Age: 48
End: 2023-03-30

## 2023-03-30 VITALS
OXYGEN SATURATION: 99 % | TEMPERATURE: 97 F | RESPIRATION RATE: 16 BRPM | HEART RATE: 88 BPM | DIASTOLIC BLOOD PRESSURE: 76 MMHG | SYSTOLIC BLOOD PRESSURE: 134 MMHG

## 2023-03-30 VITALS
OXYGEN SATURATION: 100 % | SYSTOLIC BLOOD PRESSURE: 132 MMHG | RESPIRATION RATE: 16 BRPM | WEIGHT: 210.1 LBS | HEIGHT: 72 IN | TEMPERATURE: 98 F | DIASTOLIC BLOOD PRESSURE: 88 MMHG | HEART RATE: 65 BPM

## 2023-03-30 DIAGNOSIS — Z90.49 ACQUIRED ABSENCE OF OTHER SPECIFIED PARTS OF DIGESTIVE TRACT: Chronic | ICD-10-CM

## 2023-03-30 DIAGNOSIS — Z98.890 OTHER SPECIFIED POSTPROCEDURAL STATES: Chronic | ICD-10-CM

## 2023-03-30 DIAGNOSIS — K43.6 OTHER AND UNSPECIFIED VENTRAL HERNIA WITH OBSTRUCTION, WITHOUT GANGRENE: ICD-10-CM

## 2023-03-30 PROCEDURE — 88302 TISSUE EXAM BY PATHOLOGIST: CPT

## 2023-03-30 PROCEDURE — 88302 TISSUE EXAM BY PATHOLOGIST: CPT | Mod: 26

## 2023-03-30 PROCEDURE — 49596 RPR AA HRN 1ST > 10 NCR/STRN: CPT | Mod: AS

## 2023-03-30 PROCEDURE — C1889: CPT

## 2023-03-30 PROCEDURE — C1781: CPT

## 2023-03-30 PROCEDURE — C9399: CPT

## 2023-03-30 PROCEDURE — 49596 RPR AA HRN 1ST > 10 NCR/STRN: CPT

## 2023-03-30 RX ORDER — OXYCODONE AND ACETAMINOPHEN 5; 325 MG/1; MG/1
1 TABLET ORAL EVERY 4 HOURS
Refills: 0 | Status: DISCONTINUED | OUTPATIENT
Start: 2023-03-30 | End: 2023-03-30

## 2023-03-30 RX ORDER — OXYCODONE AND ACETAMINOPHEN 5; 325 MG/1; MG/1
1 TABLET ORAL
Qty: 20 | Refills: 0
Start: 2023-03-30

## 2023-03-30 RX ORDER — ONDANSETRON 8 MG/1
4 TABLET, FILM COATED ORAL EVERY 6 HOURS
Refills: 0 | Status: DISCONTINUED | OUTPATIENT
Start: 2023-03-30 | End: 2023-03-30

## 2023-03-30 NOTE — BRIEF OPERATIVE NOTE - OPERATION/FINDINGS
ventral incisional hernia mostly around the old ostomy site defect measuring 11cm, and a small ventral incisional hernia periumbilical. +adhesions lysed. Mesh goodideazs echo PS used.

## 2023-03-30 NOTE — ASU PATIENT PROFILE, ADULT - NS TRANSFER PATIENT BELONGINGS
Continue: Refresh Tears (carboxymethylcellulose sodium): drops: 0.5% twice a day into both eyes Cell Phone/PDA (specify)/Clothing

## 2023-03-30 NOTE — ASU DISCHARGE PLAN (ADULT/PEDIATRIC) - NS MD DC FALL RISK RISK
For information on Fall & Injury Prevention, visit: [email or web address] OR  [email or web address] OR  https://www.cdc.gov/steadi/patient.html

## 2023-03-30 NOTE — BRIEF OPERATIVE NOTE - NSICDXBRIEFPREOP_GEN_ALL_CORE_FT
Instructions: This plan will send the code FBSE to the PM system.  DO NOT or CHANGE the price.
Detail Level: Simple
Price (Do Not Change): 0.00
PRE-OP DIAGNOSIS:  Ventral incisional hernia 30-Mar-2023 10:03:15  Harvey Saavedra A

## 2023-03-30 NOTE — BRIEF OPERATIVE NOTE - NSICDXBRIEFPROCEDURE_GEN_ALL_CORE_FT
PROCEDURES:  Laparoscopic repair of incisional or ventral hernia with transversus abdominis muscle release 30-Mar-2023 10:02:55  Harvey Saavedra A

## 2023-03-30 NOTE — ASU PATIENT PROFILE, ADULT - FALL HARM RISK - UNIVERSAL INTERVENTIONS
Bed in lowest position, wheels locked, appropriate side rails in place/Call bell, personal items and telephone in reach/Instruct patient to call for assistance before getting out of bed or chair/Non-slip footwear when patient is out of bed/[place] to call system/Purposeful Proactive Rounding/Room/bathroom lighting operational, light cord in reach

## 2023-03-30 NOTE — ASU DISCHARGE PLAN (ADULT/PEDIATRIC) - CARE PROVIDER_API CALL
Jacques Pino; ARSLAN)  ColonRectal Surgery; Surgery  [address] [place] [address]  Phone: [phone]  Fax: [phone]  Follow Up Time: 2 weeks

## 2023-03-30 NOTE — ASU PATIENT PROFILE, ADULT - ALCOHOL USE HISTORY SINGLE SELECT
never Stage 2: Additional Anesthesia Type: 1% lidocaine with 1:100,000 epinephrine and a 1:10 solution of 8.4% sodium bicarbonate

## 2023-04-04 LAB — SURGICAL PATHOLOGY STUDY: SIGNIFICANT CHANGE UP

## 2023-04-05 DIAGNOSIS — K43.0 INCISIONAL HERNIA WITH OBSTRUCTION, WITHOUT GANGRENE: ICD-10-CM

## 2023-04-05 DIAGNOSIS — Z90.49 ACQUIRED ABSENCE OF OTHER SPECIFIED PARTS OF DIGESTIVE TRACT: ICD-10-CM

## 2024-10-08 NOTE — PATIENT PROFILE ADULT - NSPROMUTANXFEARADDRESSFT_GEN_A_NUR
Patient admitted from OR to PACU.  Bedside report received.  Patient immediately hooked up to heart monitor.Shyla Thomas RN     n/a

## 2025-07-01 NOTE — PROVIDER CONTACT NOTE (OTHER) - DATE AND TIME:
Completed patient form signed by PCP and scanned into patient record.  Form has been sent via [employer] message   14-Feb-2020 09:36